# Patient Record
Sex: FEMALE | Race: WHITE | Employment: FULL TIME | URBAN - METROPOLITAN AREA
[De-identification: names, ages, dates, MRNs, and addresses within clinical notes are randomized per-mention and may not be internally consistent; named-entity substitution may affect disease eponyms.]

---

## 2017-12-05 RX ORDER — BISMUTH SUBSALICYLATE 262 MG
1 TABLET,CHEWABLE ORAL DAILY
COMMUNITY

## 2017-12-05 RX ORDER — FLUOXETINE HYDROCHLORIDE 20 MG/1
20 CAPSULE ORAL
COMMUNITY

## 2017-12-05 NOTE — PERIOP NOTES
[facility]  PREOPERATIVE INSTRUCTIONS    Surgery Date:   12/8/2017      Surgery arrival time given by surgeon: NO  (If [facility] staff will call you between 3pm - 7pm the day before surgery with your arrival time. If your surgery is on a Monday, we will call you the preceding Friday. Please call [phone] after 7pm if you did not receive your arrival time.)  1. Report  to the 2nd Floor Admitting Desk on the day of your surgery. Bring your insurance card, photo identification, and any copayment (if applicable). 2. You must have a responsible adult to drive you home and stay with you the first 24 hours after surgery if you are going home the same day of your surgery. 3. Nothing to eat or drink after midnight the night before surgery. This means NO water, gum, mints, coffee, juice, etc.    4. MEDICATIONS TO TAKE THE MORNING OF SURGERY WITH A SIP OF WATER:prozac  5. No alcoholic beverages 24 hours before and after your surgery. 6. If you are being admitted to the hospital,please leave personal belongings/luggage in your car until you have an assigned hospital room number. ( The hospital discharge time is 12 PM NOON. Your adult  should be at the hospital prior to the noon discharge time unless otherwise instructed.)   7. STOP Aspirin and/or any non-steroidal anti-inflammatory drugs (i.e. Ibuprofen, Naproxen, Advil, Aleve) as directed by your surgeon. You may take Tylenol. Stop herbal supplements 1 week prior to  surgery. 8. If you are currently taking Plavix, Coumadin,or any other blood-thinning/ anticoagulant medication contact your surgeon for instructions. 9. Wear comfortable clothes. Wear your glasses instead of contacts. Please leave all money, jewelry and valuables at home. No make up, particularly mascara, the day of surgery. 10.  REMOVE ALL body piercings, rings,and jewelry and leave at home. Wear your hair loose or down, no pony-tails, buns, or any metal hair clips.    11. If you shower the morning of surgery, please do not apply any lotions, powders, or deodorants afterwards. Do not shave any body area within 24 hours of your surgery. 12. Please follow all instructions to avoid any potential surgical cancellation. 13. Should your physical condition change, (i.e. fever, cold, flu, etc.) please notify your surgeon as soon as possible. 14. It is important to be on time. If a situation occurs where you may be delayed, please call:  [phone] / [phone] on the day of surgery. 15. The [facility] staff can be reached at [phone]. 16. Special instructions: none  17. The patient was contacted  via phone. She  verbalize  understanding of all instructions does not  need reinforcement.

## 2017-12-07 ENCOUNTER — ANESTHESIA EVENT (OUTPATIENT)
Dept: SURGERY | Age: 42
End: 2017-12-07
Payer: COMMERCIAL

## 2017-12-07 NOTE — PERIOP NOTES
Spoke to 80Arielle Ward at Dr. Melisa Arellano office requesting orders and H&P for surgery.   DOS: 12/8/2017

## 2017-12-08 ENCOUNTER — ANESTHESIA (OUTPATIENT)
Dept: SURGERY | Age: 42
End: 2017-12-08
Payer: COMMERCIAL

## 2017-12-08 ENCOUNTER — HOSPITAL ENCOUNTER (OUTPATIENT)
Dept: GENERAL RADIOLOGY | Age: 42
Discharge: HOME | End: 2017-12-08
Attending: PODIATRIST
Payer: COMMERCIAL

## 2017-12-08 ENCOUNTER — HOSPITAL ENCOUNTER (OUTPATIENT)
Age: 42
Setting detail: OUTPATIENT SURGERY
Discharge: HOME | End: 2017-12-08
Attending: PODIATRIST | Admitting: PODIATRIST
Payer: COMMERCIAL

## 2017-12-08 VITALS
OXYGEN SATURATION: 99 % | RESPIRATION RATE: 12 BRPM | TEMPERATURE: 97.8 F | HEIGHT: 71 IN | BODY MASS INDEX: 21.7 KG/M2 | SYSTOLIC BLOOD PRESSURE: 112 MMHG | WEIGHT: 154.98 LBS | HEART RATE: 65 BPM | DIASTOLIC BLOOD PRESSURE: 55 MMHG

## 2017-12-08 DIAGNOSIS — Z98.890 S/P BUNIONECTOMY: ICD-10-CM

## 2017-12-08 LAB — HCG UR QL: NEGATIVE

## 2017-12-08 PROCEDURE — 81025 URINE PREGNANCY TEST: CPT

## 2017-12-08 PROCEDURE — 76010000131 HC OR TIME 2 TO 2.5 HR: Performed by: PODIATRIST

## 2017-12-08 PROCEDURE — 74011250636 HC RX REV CODE- 250/636: Performed by: PODIATRIST

## 2017-12-08 PROCEDURE — 76210000023 HC REC RM PH II 2 TO 2.5 HR: Performed by: PODIATRIST

## 2017-12-08 PROCEDURE — 74011250637 HC RX REV CODE- 250/637: Performed by: PODIATRIST

## 2017-12-08 PROCEDURE — 77030031139 HC SUT VCRL2 J&J -A: Performed by: PODIATRIST

## 2017-12-08 PROCEDURE — 77030011881 HC TAPE CST FBRGLS BSNM -A: Performed by: PODIATRIST

## 2017-12-08 PROCEDURE — 77030028224 HC PDNG CST BSNM -A: Performed by: PODIATRIST

## 2017-12-08 PROCEDURE — 74011250636 HC RX REV CODE- 250/636: Performed by: ANESTHESIOLOGY

## 2017-12-08 PROCEDURE — 76000 FLUOROSCOPY <1 HR PHYS/QHP: CPT

## 2017-12-08 PROCEDURE — 77030011640 HC PAD GRND REM COVD -A: Performed by: PODIATRIST

## 2017-12-08 PROCEDURE — 77030008845 HC WRE K STRY -B: Performed by: PODIATRIST

## 2017-12-08 PROCEDURE — 77030020268 HC MISC GENERAL SUPPLY: Performed by: PODIATRIST

## 2017-12-08 PROCEDURE — 77030018836 HC SOL IRR NACL ICUM -A: Performed by: PODIATRIST

## 2017-12-08 PROCEDURE — 74011000250 HC RX REV CODE- 250: Performed by: PODIATRIST

## 2017-12-08 PROCEDURE — 77030000032 HC CUF TRNQT ZIMM -B: Performed by: PODIATRIST

## 2017-12-08 PROCEDURE — 97161 PT EVAL LOW COMPLEX 20 MIN: CPT

## 2017-12-08 PROCEDURE — 77030006773 HC BLD SAW OSC BRSM -A: Performed by: PODIATRIST

## 2017-12-08 PROCEDURE — C1713 ANCHOR/SCREW BN/BN,TIS/BN: HCPCS | Performed by: PODIATRIST

## 2017-12-08 PROCEDURE — 74011000250 HC RX REV CODE- 250

## 2017-12-08 PROCEDURE — 77030002986 HC SUT PROL J&J -A: Performed by: PODIATRIST

## 2017-12-08 PROCEDURE — 77030020782 HC GWN BAIR PAWS FLX 3M -B

## 2017-12-08 PROCEDURE — 76060000035 HC ANESTHESIA 2 TO 2.5 HR: Performed by: PODIATRIST

## 2017-12-08 PROCEDURE — 74011250636 HC RX REV CODE- 250/636

## 2017-12-08 PROCEDURE — 97116 GAIT TRAINING THERAPY: CPT

## 2017-12-08 PROCEDURE — 77030002933 HC SUT MCRYL J&J -A: Performed by: PODIATRIST

## 2017-12-08 RX ORDER — SODIUM CHLORIDE, SODIUM LACTATE, POTASSIUM CHLORIDE, CALCIUM CHLORIDE 600; 310; 30; 20 MG/100ML; MG/100ML; MG/100ML; MG/100ML
125 INJECTION, SOLUTION INTRAVENOUS CONTINUOUS
Status: DISCONTINUED | OUTPATIENT
Start: 2017-12-08 | End: 2017-12-08 | Stop reason: HOSPADM

## 2017-12-08 RX ORDER — BUPIVACAINE HYDROCHLORIDE 5 MG/ML
INJECTION, SOLUTION EPIDURAL; INTRACAUDAL AS NEEDED
Status: DISCONTINUED | OUTPATIENT
Start: 2017-12-08 | End: 2017-12-08 | Stop reason: HOSPADM

## 2017-12-08 RX ORDER — PROPOFOL 10 MG/ML
INJECTION, EMULSION INTRAVENOUS AS NEEDED
Status: DISCONTINUED | OUTPATIENT
Start: 2017-12-08 | End: 2017-12-08 | Stop reason: HOSPADM

## 2017-12-08 RX ORDER — DEXAMETHASONE SODIUM PHOSPHATE 4 MG/ML
INJECTION, SOLUTION INTRA-ARTICULAR; INTRALESIONAL; INTRAMUSCULAR; INTRAVENOUS; SOFT TISSUE AS NEEDED
Status: DISCONTINUED | OUTPATIENT
Start: 2017-12-08 | End: 2017-12-08 | Stop reason: HOSPADM

## 2017-12-08 RX ORDER — LIDOCAINE HYDROCHLORIDE 20 MG/ML
INJECTION, SOLUTION INFILTRATION; PERINEURAL AS NEEDED
Status: DISCONTINUED | OUTPATIENT
Start: 2017-12-08 | End: 2017-12-08 | Stop reason: HOSPADM

## 2017-12-08 RX ORDER — HYDROMORPHONE HYDROCHLORIDE 1 MG/ML
.5-1 INJECTION, SOLUTION INTRAMUSCULAR; INTRAVENOUS; SUBCUTANEOUS
Status: DISCONTINUED | OUTPATIENT
Start: 2017-12-08 | End: 2017-12-08 | Stop reason: HOSPADM

## 2017-12-08 RX ORDER — CEFAZOLIN SODIUM IN 0.9 % NACL 2 G/50 ML
2 INTRAVENOUS SOLUTION, PIGGYBACK (ML) INTRAVENOUS ONCE
Status: COMPLETED | OUTPATIENT
Start: 2017-12-08 | End: 2017-12-08

## 2017-12-08 RX ORDER — OXYCODONE AND ACETAMINOPHEN 5; 325 MG/1; MG/1
2 TABLET ORAL
Status: COMPLETED | OUTPATIENT
Start: 2017-12-08 | End: 2017-12-08

## 2017-12-08 RX ORDER — SODIUM CHLORIDE 0.9 % (FLUSH) 0.9 %
5-10 SYRINGE (ML) INJECTION EVERY 8 HOURS
Status: DISCONTINUED | OUTPATIENT
Start: 2017-12-08 | End: 2017-12-08 | Stop reason: HOSPADM

## 2017-12-08 RX ORDER — FENTANYL CITRATE 50 UG/ML
INJECTION, SOLUTION INTRAMUSCULAR; INTRAVENOUS AS NEEDED
Status: DISCONTINUED | OUTPATIENT
Start: 2017-12-08 | End: 2017-12-08 | Stop reason: HOSPADM

## 2017-12-08 RX ORDER — ONDANSETRON 2 MG/ML
INJECTION INTRAMUSCULAR; INTRAVENOUS AS NEEDED
Status: DISCONTINUED | OUTPATIENT
Start: 2017-12-08 | End: 2017-12-08 | Stop reason: HOSPADM

## 2017-12-08 RX ORDER — IBUPROFEN 800 MG/1
800 TABLET ORAL
Qty: 60 TAB | Refills: 0 | Status: SHIPPED | OUTPATIENT
Start: 2017-12-08 | End: 2022-07-29 | Stop reason: ALTCHOICE

## 2017-12-08 RX ORDER — MIDAZOLAM HYDROCHLORIDE 1 MG/ML
INJECTION, SOLUTION INTRAMUSCULAR; INTRAVENOUS AS NEEDED
Status: DISCONTINUED | OUTPATIENT
Start: 2017-12-08 | End: 2017-12-08 | Stop reason: HOSPADM

## 2017-12-08 RX ORDER — OXYCODONE AND ACETAMINOPHEN 5; 325 MG/1; MG/1
1 TABLET ORAL
Qty: 30 TAB | Refills: 0 | Status: SHIPPED | OUTPATIENT
Start: 2017-12-08 | End: 2022-03-18

## 2017-12-08 RX ORDER — SODIUM CHLORIDE 0.9 % (FLUSH) 0.9 %
5-10 SYRINGE (ML) INJECTION AS NEEDED
Status: DISCONTINUED | OUTPATIENT
Start: 2017-12-08 | End: 2017-12-08 | Stop reason: HOSPADM

## 2017-12-08 RX ORDER — ONDANSETRON 2 MG/ML
4 INJECTION INTRAMUSCULAR; INTRAVENOUS AS NEEDED
Status: DISCONTINUED | OUTPATIENT
Start: 2017-12-08 | End: 2017-12-08 | Stop reason: HOSPADM

## 2017-12-08 RX ORDER — LIDOCAINE HYDROCHLORIDE 10 MG/ML
0.1 INJECTION, SOLUTION EPIDURAL; INFILTRATION; INTRACAUDAL; PERINEURAL AS NEEDED
Status: DISCONTINUED | OUTPATIENT
Start: 2017-12-08 | End: 2017-12-08 | Stop reason: HOSPADM

## 2017-12-08 RX ORDER — PROPOFOL 10 MG/ML
INJECTION, EMULSION INTRAVENOUS
Status: DISCONTINUED | OUTPATIENT
Start: 2017-12-08 | End: 2017-12-08 | Stop reason: HOSPADM

## 2017-12-08 RX ADMIN — MIDAZOLAM HYDROCHLORIDE 1 MG: 1 INJECTION, SOLUTION INTRAMUSCULAR; INTRAVENOUS at 07:54

## 2017-12-08 RX ADMIN — FENTANYL CITRATE 25 MCG: 50 INJECTION, SOLUTION INTRAMUSCULAR; INTRAVENOUS at 07:55

## 2017-12-08 RX ADMIN — SODIUM CHLORIDE, POTASSIUM CHLORIDE, SODIUM LACTATE AND CALCIUM CHLORIDE: 600; 310; 30; 20 INJECTION, SOLUTION INTRAVENOUS at 09:15

## 2017-12-08 RX ADMIN — MIDAZOLAM HYDROCHLORIDE 2 MG: 1 INJECTION, SOLUTION INTRAMUSCULAR; INTRAVENOUS at 07:35

## 2017-12-08 RX ADMIN — PROPOFOL 50 MG: 10 INJECTION, EMULSION INTRAVENOUS at 07:55

## 2017-12-08 RX ADMIN — DEXAMETHASONE SODIUM PHOSPHATE 4 MG: 4 INJECTION, SOLUTION INTRA-ARTICULAR; INTRALESIONAL; INTRAMUSCULAR; INTRAVENOUS; SOFT TISSUE at 07:45

## 2017-12-08 RX ADMIN — PROPOFOL 50 MG: 10 INJECTION, EMULSION INTRAVENOUS at 07:56

## 2017-12-08 RX ADMIN — ONDANSETRON 4 MG: 2 INJECTION INTRAMUSCULAR; INTRAVENOUS at 08:56

## 2017-12-08 RX ADMIN — OXYCODONE HYDROCHLORIDE AND ACETAMINOPHEN 2 TABLET: 5; 325 TABLET ORAL at 11:39

## 2017-12-08 RX ADMIN — FENTANYL CITRATE 25 MCG: 50 INJECTION, SOLUTION INTRAMUSCULAR; INTRAVENOUS at 09:11

## 2017-12-08 RX ADMIN — PROPOFOL 100 MCG/KG/MIN: 10 INJECTION, EMULSION INTRAVENOUS at 07:38

## 2017-12-08 RX ADMIN — FENTANYL CITRATE 25 MCG: 50 INJECTION, SOLUTION INTRAMUSCULAR; INTRAVENOUS at 08:00

## 2017-12-08 RX ADMIN — LIDOCAINE HYDROCHLORIDE 20 MG: 20 INJECTION, SOLUTION INFILTRATION; PERINEURAL at 07:38

## 2017-12-08 RX ADMIN — PROPOFOL 50 MG: 10 INJECTION, EMULSION INTRAVENOUS at 07:40

## 2017-12-08 RX ADMIN — MIDAZOLAM HYDROCHLORIDE 2 MG: 1 INJECTION, SOLUTION INTRAMUSCULAR; INTRAVENOUS at 07:29

## 2017-12-08 RX ADMIN — PROPOFOL 50 MG: 10 INJECTION, EMULSION INTRAVENOUS at 07:45

## 2017-12-08 RX ADMIN — SODIUM CHLORIDE, POTASSIUM CHLORIDE, SODIUM LACTATE AND CALCIUM CHLORIDE: 600; 310; 30; 20 INJECTION, SOLUTION INTRAVENOUS at 06:46

## 2017-12-08 RX ADMIN — CEFAZOLIN 2 G: 1 INJECTION, POWDER, FOR SOLUTION INTRAMUSCULAR; INTRAVENOUS; PARENTERAL at 07:30

## 2017-12-08 RX ADMIN — FENTANYL CITRATE 25 MCG: 50 INJECTION, SOLUTION INTRAMUSCULAR; INTRAVENOUS at 07:30

## 2017-12-08 RX ADMIN — SODIUM CHLORIDE, POTASSIUM CHLORIDE, SODIUM LACTATE AND CALCIUM CHLORIDE 125 ML/HR: 600; 310; 30; 20 INJECTION, SOLUTION INTRAVENOUS at 07:00

## 2017-12-08 NOTE — ANESTHESIA PREPROCEDURE EVALUATION
Anesthetic History   No history of anesthetic complications            Review of Systems / Medical History  Patient summary reviewed and nursing notes reviewed    Pulmonary  Within defined limits                 Neuro/Psych         Psychiatric history    Comments: OCD Cardiovascular  Within defined limits                Exercise tolerance: >4 METS     GI/Hepatic/Renal  Within defined limits              Endo/Other  Within defined limits           Other Findings              Physical Exam    Airway  Mallampati: II    Neck ROM: normal range of motion        Cardiovascular    Rhythm: regular           Dental  No notable dental hx       Pulmonary  Breath sounds clear to auscultation               Abdominal         Other Findings            Anesthetic Plan    ASA: 2  Anesthesia type: MAC            Anesthetic plan and risks discussed with: Patient      Informed consent obtained.

## 2017-12-08 NOTE — IP AVS SNAPSHOT
Kristine Ballesteros 
 
 
 [address] 
[phone] Patient: Willow Pride MRN: [id number] :1975 About your hospitalization You were admitted on:  2017 You last received care in the:  [facility] PACU You were discharged on:  2017 Why you were hospitalized Your primary diagnosis was:  Not on File Things You Need To Do (next 8 weeks) Follow up with Kavon Smiley DPM in 1 week(s) Phone:  [phone] Where:  [address] , [address] [place] [address] Follow up with Zeyad Ragland NP Phone:  [phone] [facility] [address] [facility], [address] [place] [address] Discharge Orders None A check fady indicates which time of day the medication should be taken. My Medications TAKE these medications as instructed Instructions Each Dose to Equal  
 Morning Noon Evening Bedtime  
 ibuprofen 800 mg tablet Commonly known as:  MOTRIN Your last dose was: Your next dose is: Take 1 Tab by mouth every eight (8) hours as needed for Pain. 800 mg  
    
   
   
   
  
 multivitamin tablet Commonly known as:  ONE A DAY Your last dose was: Your next dose is: Take 1 Tab by mouth daily. 1 Tab  
    
   
   
   
  
 oxyCODONE-acetaminophen 5-325 mg per tablet Commonly known as:  PERCOCET Your last dose was: Your next dose is: Take 1 Tab by mouth every six (6) hours as needed for Pain. Max Daily Amount: 4 Tabs. Indications: Pain 1 Tab PROzac 20 mg capsule Generic drug:  FLUoxetine Your last dose was: Your next dose is: Take 20 mg by mouth every morning. 20 mg Where to Get Your Medications Information on where to get these meds will be given to you by the nurse or doctor. ! Ask your nurse or doctor about these medications  
  ibuprofen 800 mg tablet  
 oxyCODONE-acetaminophen 5-325 mg per tablet Discharge Instructions [facility] Podiatric Medicine - Foot Surgery [address] [place] [address]. [address] OFFICE [phone] CELL    [phone] You have just had surgery on your foot and/or ankle. Proper care during the post-operative period is an integral part of your surgical treatment program.  It is imperative that these instructions are followed to insure proper healing and to obtain the best results. 1. GO directly home and keep your feet elevated on the way. 2. DRESSING OR CAST - Keep your dressing or cast clean and dry. DO NOT remove the bandage or inspect the wound. A small amount of blood on the bandage is normal.  If the dressing falls off or gets wet, call the office immediately. 3. ELEVATION - Place two pillows under the knee and leg. Make sure to support underneath your knees. You should elevate your leg whenever you are sitting or lying down. This includes mealtime and when retiring for the night. 4. ICE - Apply 1 or 2 small bags of ice close to, BUT NOT DIRECTLY OVER, the surgical site. The ice should be ON FOR TWENTY MINUTES, THEN OFF FOR ONE HALF HOUR. Continue this sequence throughout the day. Remember to keep the dressing or cast dry. Double-bagging the ice will help. 5. Limited pain and swelling is expected. 6. Exercise your legs frequently by bending your knees to stimulate circulation and speed healing. 7. You are to be:  
· NON-WEIGHT BEARING - you are not allowed to touch your foot to the floor and/or ground. You must use crutches or a walker at all times. 8. If you have a surgical shoe - it should be worn whenever you are standing or walking.  
9. MEALS - Your first meal at home should be a light one such as toast or soup.  If nausea and vomiting develop call the office immediately. Drink plenty of fluid and resume a well balanced diet. 10. Sponge baths are recommended until your first post-operative appointment. 11. PRESCRIPTIONS - Please fill and take as directed. If you have any difficulties or experience any side effects after taking this medication please discontinue and call the office and/or go to your closest Emergency Room immediately. Call our office to make your next appointment; Also, if you have any of the following: · Temperature above 100.5 degrees · Your bandage becomes overly stained, falls off or gets wet · You bump or injure the surgical site · Your medication does not stop discomfort WE WANT YOUR SURGERY AND RECOVERY TO BE SUCCESSFUL AND AS COMFORTABLE AS POSSIBLE. THANK YOU FOR FOLLOWING THESE INSTRUCTIONS. IF YOU HAVE ANY PROBLEMS OR QUESTIONS PLEASE CALL OUR OFFICE. DISCHARGE SUMMARY from your Nurse The following personal items collected during your admission are returned to you:  
Dental Appliance: Dental Appliances: None Vision: Visual Aid: None Hearing Aid:   
Jewelry: Jewelry: None Clothing: Clothing: Other (comment) (street clothes to locker) Other Valuables: Other Valuables: None Valuables sent to safe:   
 
PATIENT INSTRUCTIONS: 
 
After general anesthesia or intravenous sedation, for 24 hours or while taking prescription Narcotics: · Limit your activities · Do not drive and operate hazardous machinery · Do not make important personal or business decisions · Do  not drink alcoholic beverages · If you have not urinated within 8 hours after discharge, please contact your surgeon on call. Report the following to your surgeon: 
· Excessive pain, swelling, redness or odor of or around the surgical area · Temperature over 100.5 · Nausea and vomiting lasting longer than 4 hours or if unable to take medications · Any signs of decreased circulation or nerve impairment to extremity: change in color, persistent  numbness, tingling, coldness or increase pain · Any questions [address] Breathing deep and coughing are very important exercises to do after surgery. Deep breathing and coughing open the little air tubes and air sacks in your lungs. You take deep breaths every day. You may not even notice - it is just something you do when you sigh or yawn. It is a natural exercise you do to keep these air passages open. After surgery, take deep breaths and cough, on purpose. Coughing and deep breathing help prevent bronchitis and pneumonia after surgery. If you had chest or belly surgery, use a pillow as a \"hug randy\" and hold it tightly to your chest or belly when you cough. DIRECTIONS: 
10. Take 10 to 15 slow deep breaths every hour while awake. 11. Breathe in deeply, and hold it for 2 seconds. 12. Exhale slowly through puckered lips, like blowing up a balloon. 13. After every 4th or 5th deep breath, hug your pillow to your chest or belly and give a hard, deep cough. Yes, it will probably hurt. But doing this exercise is very important part of healing after surgery. Take your pain medicine to help you do this exercise without too much pain. IF YOU HAVE BEEN DIAGNOSED WITH SLEEP APNEA, PLEASE USE YOUR SLEEP APNEA DEVICE OR CPAP MACHINE WHEN YOU INTEND TO NAP AFTER TAKING PAIN MEDICATION. Ankle Pumps Ankle pumps increase the circulation of oxygenated blood to your lower extremities and decrease your risk for circulation problems such as blood clots. They also stretch the muscles, tendons and ligaments in your foot and ankle, and prevent joint contracture in the ankle and foot, especially after surgeries on the legs. It is important to do ankle pump exercises regularly after surgery because immobility increases your risk for developing a blood clot.   Your doctor may also have you take an Aspirin for the next few days as well. If your doctor did not ask you to take an Aspirin, consult with him before starting Aspirin therapy on your own. Slowly point your foot forward, feeling the muscles on the top of your lower leg stretch, and hold this position for 5 seconds. Next, pull your foot back toward you as far as possible, stretching the calf muscles, and hold that position for 5 seconds. Repeat with the other foot. Perform 10 repetitions every hour while awake for both ankles if possible (down and then up with the foot once is one repetition). You should feel gentle stretching of the muscles in your lower leg when doing this exercise. If you feel pain, or your range of motion is limited, don't  Push too hard. Only go the limit your joint and muscles will let you go. If you have increasing pain, progressively worsening leg warmth or swelling, STOP the exercise and call your doctor. Below is information about the medications your doctor is prescribing after your visit: 
 
 
Ibuprofen Percocet Introducing [facility] SERVICES! Julio Sky introduces [employer] patient portal. Now you can access parts of your medical record, email your doctor's office, and request medication refills online. 1. In your internet browser, go to [email or web address] 2. Click on the First Time User? Click Here link in the Sign In box. You will see the New Member Sign Up page. 3. Enter your [employer] Access Code exactly as it appears below. You will not need to use this code after youve completed the sign-up process. If you do not sign up before the expiration date, you must request a new code. · [employer] Access Code: JUAN C BONILLA [facility] Expires: 3/8/2018  5:44 AM 
 
4. Enter the last four digits of your Social Security Number (xxxx) and Date of Birth (mm/dd/yyyy) as indicated and click Submit. You will be taken to the next sign-up page. 5. Create a Y[employer]Zing ID. This will be your [employer]ing login ID and cannot be changed, so think of one that is secure and easy to remember. 6. Create a [employer] password. You can change your password at any time. 7. Enter your Password Reset Question and Answer. This can be used at a later time if you forget your password. 8. Enter your e-mail address. You will receive e-mail notification when new information is available in [address] 9. Click Sign Up. You can now view and download portions of your medical record. 10. Click the Download Summary menu link to download a portable copy of your medical information. If you have questions, please visit the Frequently Asked Questions section of the [employer] website.  Remember, [employer] is NOT to be used for urgent needs. For medical emergencies, dial 911. Now available from your iPhone and Android! Providers Seen During Your Hospitalization Provider Specialty Primary office phone Terricaroline Partida, [address] [phone] Your Primary Care Physician (PCP) Primary Care Physician Office Phone Office Fax Jacy Walsh [phone] You are allergic to the following No active allergies Recent Documentation Height Weight BMI OB Status Smoking Status 1.803 m 70.3 kg 21.62 kg/m2 IUD Never Smoker Emergency Contacts Name Discharge Info Relation Home Work Mobile Urbano Lewis DISCHARGE CAREGIVER [3] Spouse [3] [phone] Patient Belongings The following personal items are in your possession at time of discharge: 
  Dental Appliances: None  Visual Aid: None      Home Medications: None   Jewelry: None  Clothing: Other (comment) (street clothes to locker)    Other Valuables: None Please provide this summary of care documentation to your next provider. Signatures-by signing, you are acknowledging that this After Visit Summary has been reviewed with you and you have received a copy. Patient Signature:  ____________________________________________________________ Date:  ____________________________________________________________  
  
Soledad Lozada Provider Signature:  ____________________________________________________________ Date:  ____________________________________________________________

## 2017-12-08 NOTE — BRIEF OP NOTE
BRIEF OPERATIVE NOTE    Date of Procedure: 12/8/2017   Preoperative Diagnosis: RIGHT FOOT PAINFUL BUNION  Postoperative Diagnosis: RIGHT FOOT PAINFUL BUNION    Procedure(s):  RIGHT FOOT BUNIONECTOMY  Surgeon(s) and Role:     * Rafael Blanton DPM - Primary         Assistant Staff:       Surgical Staff:  Circ-1: Michael Issa RN  Circ-Relief: Yessi Mclain RN  Scrub Tech-1: Russ Hardyub RN-Relief: Yessi Mclain RN  Surg Asst-1: Pineda Clore  Event Time In   Incision Start 4111   Incision Close 3089     Anesthesia: MAC   Estimated Blood Loss: Minimal  Specimens: * No specimens in log *   Findings: HAV   Complications: None  Implants:   Implant Name Type Inv.  Item Serial No.  Lot No. LRB No. Used Action   CANNULATED SCREWS     NA [employer] NA Right 2 Implanted

## 2017-12-08 NOTE — DISCHARGE INSTRUCTIONS
[employer] [id number]  [facility]  OFFICE [phone]  CELL    [phone]    You have just had surgery on your foot and/or ankle. Proper care during the post-operative period is an integral part of your surgical treatment program.  It is imperative that these instructions are followed to insure proper healing and to obtain the best results. 1. GO directly home and keep your feet elevated on the way. 2. DRESSING OR CAST - Keep your dressing or cast clean and dry. DO NOT remove the bandage or inspect the wound. A small amount of blood on the bandage is normal.  If the dressing falls off or gets wet, call the office immediately. 3. ELEVATION - Place two pillows under the knee and leg. Make sure to support underneath your knees. You should elevate your leg whenever you are sitting or lying down. This includes mealtime and when retiring for the night. 4. ICE - Apply 1 or 2 small bags of ice close to, BUT NOT DIRECTLY OVER, the surgical site. The ice should be ON FOR TWENTY MINUTES, THEN OFF FOR ONE HALF HOUR. Continue this sequence throughout the day. Remember to keep the dressing or cast dry. Double-bagging the ice will help. 5. Limited pain and swelling is expected. 6. Exercise your legs frequently by bending your knees to stimulate circulation and speed healing. 7. You are to be:   · NON-WEIGHT BEARING - you are not allowed to touch your foot to the floor and/or ground. You must use crutches or a walker at all times. 8. If you have a surgical shoe - it should be worn whenever you are standing or walking. 9. MEALS - Your first meal at home should be a light one such as toast or soup. If nausea and vomiting develop call the office immediately. Drink plenty of fluid and resume a well balanced diet. 10. Sponge baths are recommended until your first post-operative appointment.   11. PRESCRIPTIONS - Please fill and take as directed. If you have any difficulties or experience any side effects after taking this medication please discontinue and call the office and/or go to your closest Emergency Room immediately. Call our office to make your next appointment; Also, if you have any of the following:  · Temperature above 100.5 degrees  · Your bandage becomes overly stained, falls off or gets wet  · You bump or injure the surgical site  · Your medication does not stop discomfort    WE WANT YOUR SURGERY AND RECOVERY TO BE SUCCESSFUL AND AS COMFORTABLE AS POSSIBLE. THANK YOU FOR FOLLOWING THESE INSTRUCTIONS. IF YOU HAVE ANY PROBLEMS OR QUESTIONS PLEASE CALL OUR OFFICE. DISCHARGE SUMMARY from your Nurse    The following personal items collected during your admission are returned to you:   Dental Appliance: Dental Appliances: None  Vision: Visual Aid: None  Hearing Aid:    Jewelry: Jewelry: None  Clothing: Clothing: Other (comment) (street clothes to locker)  Other Valuables: Other Valuables: None  Valuables sent to safe:      PATIENT INSTRUCTIONS:    After general anesthesia or intravenous sedation, for 24 hours or while taking prescription Narcotics:  · Limit your activities  · Do not drive and operate hazardous machinery  · Do not make important personal or business decisions  · Do  not drink alcoholic beverages  · If you have not urinated within 8 hours after discharge, please contact your surgeon on call. Report the following to your surgeon:  · Excessive pain, swelling, redness or odor of or around the surgical area  · Temperature over 100.5  · Nausea and vomiting lasting longer than 4 hours or if unable to take medications  · Any signs of decreased circulation or nerve impairment to extremity: change in color, persistent  numbness, tingling, coldness or increase pain  · Any questions    COUGH AND DEEP BREATHE    Breathing deep and coughing are very important exercises to do after surgery.   Deep breathing and coughing open the little air tubes and air sacks in your lungs. You take deep breaths every day. You may not even notice - it is just something you do when you sigh or yawn. It is a natural exercise you do to keep these air passages open. After surgery, take deep breaths and cough, on purpose. Coughing and deep breathing help prevent bronchitis and pneumonia after surgery. If you had chest or belly surgery, use a pillow as a \"hug randy\" and hold it tightly to your chest or belly when you cough. DIRECTIONS:  10. Take 10 to 15 slow deep breaths every hour while awake. 11. Breathe in deeply, and hold it for 2 seconds. 12. Exhale slowly through puckered lips, like blowing up a balloon. 13. After every 4th or 5th deep breath, hug your pillow to your chest or belly and give a hard, deep cough. Yes, it will probably hurt. But doing this exercise is very important part of healing after surgery. Take your pain medicine to help you do this exercise without too much pain. IF YOU HAVE BEEN DIAGNOSED WITH SLEEP APNEA, PLEASE USE YOUR SLEEP APNEA DEVICE OR CPAP MACHINE WHEN YOU INTEND TO NAP AFTER TAKING PAIN MEDICATION. Ankle Pumps    Ankle pumps increase the circulation of oxygenated blood to your lower extremities and decrease your risk for circulation problems such as blood clots. They also stretch the muscles, tendons and ligaments in your foot and ankle, and prevent joint contracture in the ankle and foot, especially after surgeries on the legs. It is important to do ankle pump exercises regularly after surgery because immobility increases your risk for developing a blood clot. Your doctor may also have you take an Aspirin for the next few days as well. If your doctor did not ask you to take an Aspirin, consult with him before starting Aspirin therapy on your own.       Slowly point your foot forward, feeling the muscles on the top of your lower leg stretch, and hold this position for 5 seconds. Next, pull your foot back toward you as far as possible, stretching the calf muscles, and hold that position for 5 seconds. Repeat with the other foot. Perform 10 repetitions every hour while awake for both ankles if possible (down and then up with the foot once is one repetition). You should feel gentle stretching of the muscles in your lower leg when doing this exercise. If you feel pain, or your range of motion is limited, don't  Push too hard. Only go the limit your joint and muscles will let you go. If you have increasing pain, progressively worsening leg warmth or swelling, STOP the exercise and call your doctor. Below is information about the medications your doctor is prescribing after your visit:    Other information in your discharge envelope:  []     PRESCRIPTIONS  []     PHYSICAL THERAPY PRESCRIPTION  []     APPOINTMENT CARDS  []     Regional Anesthesia Pamphlet for block or block with On-Q Catheter from Anesthesia Service  []     Medical device information sheets/pamphlets from their    []     School/work excuse note. []     /parent work excuse note. These are general instructions for a healthy lifestyle:    *  Please give a list of your current medications to your Primary Care Provider. *  Please update this list whenever your medications are discontinued, doses are      changed, or new medications (including over-the-counter products) are added. *  Please carry medication information at all times in case of emergency situations. About Smoking  No smoking / No tobacco products / Avoid exposure to second hand smoke    Surgeon General's Warning:  Quitting smoking now greatly reduces serious risk to your health. Obesity, smoking, and sedentary lifestyle greatly increases your risk for illness and disease.   A healthy diet, regular physical exercise & weight monitoring are important for maintaining a healthy lifestyle. Congestive Heart Failure  You may be retaining fluid if you have a history of heart failure or if you experience any of the following symptoms:  Weight gain of 3 pounds or more overnight or 5 pounds in a week, increased swelling in our hands or feet or shortness of breath while lying flat in bed. Please call your doctor as soon as you notice any of these symptoms; do not wait until your next office visit. Recognize signs and symptoms of STROKE:  F - face looks uneven  A - arms unable to move or move even  S - speech slurred or non-existent  T - time-call 911 as soon as signs and symptoms begin-DO NOT go         Back to bed or wait to see if you get better-TIME IS BRAIN. Warning signs of HEART ATTACK  Call 911 if you have these symptoms    · Chest discomfort. Most heart attacks involve discomfort in the center of the chest that lasts more than a few minutes, or that goes away and comes back. It can feel like uncomfortable pressure, squeezing, fullness, or pain. · Discomfort in other areas of the upper body. Symptoms can include pain or discomfort in one or both        Arms, the back, neck, jaw, or stomach. ·  Shortness of breath with or without chest discomfort. · Other signs may include breaking out in a cold sweat, nausea, or lightheadedness    Don't wait more than five minutes to call 911 - MINUTES MATTER! Fast action can save your life. Calling 911 is almost always the fastest way to get lifesaving treatment. Emergency Medical Services staff can begin treatment when they arrive - up to an hour sooner than if someone gets to the hospital by car. MARY ALBERTO MEDICATION AND SIDE EFFECT GUIDE    The [facility] MEDICATION AND SIDE EFFECT GUIDE was provided to the PATIENT AND CARE PROVIDER.   Information provided includes instruction about drug purpose and common side effects for the following medications:    Ibuprofen  Percocet

## 2017-12-08 NOTE — PROGRESS NOTES
physical Therapy EVALUATION  (Ambulatory surgery, emergency room & recovery room patients)    Patient: Morelia Officer [de-identified]40 y.o. female)  Date: 12/8/2017  Primary Diagnosis and Medical History: RIGHT FOOT PAINFUL BUNION  Procedure(s) (LRB):  RIGHT FOOT BUNIONECTOMY (Right) Day of Surgery   Past Medical History:   Diagnosis Date    Psychiatric disorder     OCD     Past Surgical History:   Procedure Laterality Date    HX PELVIC LAPAROSCOPY  1995    ovarian cyst removed    HX TONSILLECTOMY  1989   There is no problem list on file for this patient. Prior Level of Function/Home Situation: independent prior to sx  Personal factors and/or comorbidities impacting plan of care: none    Home Situation  Home Environment: Private residence  # Steps to Enter: 5  One/Two Story Residence: Two story  Living Alone: No  Support Systems: Spouse/Significant Other/Partner  Patient Expects to be Discharged to[de-identified] Private residence  Current DME Used/Available at Home: None  Ordered Weight Bearing Status:  right non-weight ,peripheral nerve block Equipment: crutches    EXAMINATION/PRESENTATION/DECISION MAKING:   Critical Behavior:  Alert and oriented x 3     Transfers:  Overall level of assistance required following instruction: contact guard assist given verbal using axillary crutches. Ambulation:  Weight bearing status during ambulation:  NWB RLE amb with crutches 30' with CG, trial of RW but patient steady with crutches        Stair Management: up and down 9 steps with rail and crutch with min assistance     Strength/ROM Limitations:  WNLs except Right knee and ankle NT      Pain:  Pain Scale 1: Numeric (0 - 10)  Pain Intensity 1: 2  Pain Location 1: Foot    Education:  Role of P.T. explained to the patient:  [x]  Yes              []   No       Topics addressed: Comments:   [x]                                    Device use and technique Issued crutches fitted  to patient's height.  present.  Patient demonstrates safe technique with SBA of spouse   [x]                                    Transfer technique    [x]                                    Gait training    [x]                                    Stair training        Patient is discharged from physical therapy at this time.     Raad Shoemaker, PT   Time Calculation: 21 mins

## 2017-12-08 NOTE — H&P
H&P Update:  Kishor Evans was seen and examined. Denies changes since medical clearance. Has matthew cleared by PCP. Patient is Npo since midnight. Consent signed and in chart. All risks, complications and benefits explained. No guarantees made to the outcome of the procedure. To OR for right foot bunionectomy.     Signed By: Sushil Canela DPM     December 8, 2017 7:47 AM

## 2017-12-08 NOTE — IP AVS SNAPSHOT
[address] 
[phone] Patient: Kishor Evans MRN: [id number] :1975 My Medications TAKE these medications as instructed Instructions Each Dose to Equal  
 Morning Noon Evening Bedtime  
 ibuprofen 800 mg tablet Commonly known as:  MOTRIN Your last dose was: Your next dose is: Take 1 Tab by mouth every eight (8) hours as needed for Pain. 800 mg  
    
   
   
   
  
 multivitamin tablet Commonly known as:  ONE A DAY Your last dose was: Your next dose is: Take 1 Tab by mouth daily. 1 Tab  
    
   
   
   
  
 oxyCODONE-acetaminophen 5-325 mg per tablet Commonly known as:  PERCOCET Your last dose was: Your next dose is: Take 1 Tab by mouth every six (6) hours as needed for Pain. Max Daily Amount: 4 Tabs. Indications: Pain 1 Tab PROzac 20 mg capsule Generic drug:  FLUoxetine Your last dose was: Your next dose is: Take 20 mg by mouth every morning. 20 mg Where to Get Your Medications Information on where to get these meds will be given to you by the nurse or doctor. ! Ask your nurse or doctor about these medications  
  ibuprofen 800 mg tablet  
 oxyCODONE-acetaminophen 5-325 mg per tablet

## 2017-12-08 NOTE — ANESTHESIA POSTPROCEDURE EVALUATION
Post-Anesthesia Evaluation and Assessment    Patient: Eduin Vela MRN: [id number]  SSN: [id number]    YOB: 1975  Age: 40 y.o. Sex: female       Cardiovascular Function/Vital Signs  Visit Vitals    /67    Pulse 73    Temp 36.6 °C (97.8 °F)    Resp 15    Ht 5' 11\" (1.803 m)    Wt 70.3 kg (154 lb 15.7 oz)    SpO2 98%    BMI 21.62 kg/m2       Patient is status post MAC anesthesia for Procedure(s):  RIGHT FOOT BUNIONECTOMY. Nausea/Vomiting: None    Postoperative hydration reviewed and adequate. Pain:  Pain Scale 1: Numeric (0 - 10) (12/08/17 0948)  Pain Intensity 1: 0 (12/08/17 0948)   Managed    Neurological Status:   Neuro (WDL): Within Defined Limits (12/08/17 0942)  Neuro  LUE Motor Response: Purposeful (12/08/17 0942)  LLE Motor Response: Purposeful (12/08/17 0942)  RUE Motor Response: Purposeful (12/08/17 0942)  RLE Motor Response: Numbness (12/08/17 0942)   At baseline    Mental Status and Level of Consciousness: Arousable    Pulmonary Status:   O2 Device: Room air (12/08/17 0943)   Adequate oxygenation and airway patent    Complications related to anesthesia: None    Post-anesthesia assessment completed.  No concerns    Signed By: Gia Mandel DO     December 8, 2017

## 2017-12-15 NOTE — OP NOTES
[facility]  OPERATIVE REPORT    Ivan López  MR#: [id number]  : 1975  ACCOUNT #: [de-identified]   DATE OF SERVICE: 2017    POOTOPERATIVE SUMMARY     DATE OF SURGERY: 2017    Delay in dictation due to the dictation line being out of service. SURGEON: Kavon Smiley DPM    ASSISTANTS: Documented in brief operative note. PREOPERATIVE DIAGNOSIS: Right foot painful hallux valgus. POSTOPERATIVE DIAGNOSIS: Right foot painful hallux valgus. PROCEDURE PERFORMED: Right foot Pablo bunionectomy. SPECIMENS REMOVED: None. ANESTHESIA: MAC with 10 mL 1:1 mixture of 1% lidocaine plain and 0.5% Marcaine plain. COMPLICATIONS: None. CONDITION: Stable. HEMOSTASIS: Achieved with a pneumatic ankle tourniquet at 250 mmHg of 90 minutes. ESTIMATED BLOOD LOSS: Minimal.     INJECTABLES: 10 mL 0.5% Marcaine plain. INDICATION FOR PROCEDURE: The patient is a 42-year-old female that presented to the preoperative holding area for right foot painful hallux valgus. She has tried conservative treatment and failed. She would like to undergo surgical intervention to improve her pain. Upon review of x-rays, there is an increase in IM angle with a prominent medial eminence of the first metatarsal head. Procedure was discussed in detail with patient and patient wants to continue with the procedure. Consent was signed in the preoperative  holding area. Site and side was marked. All risks, complications and benefits were explained. PROCEDURE IN DETAIL: Patient was identified in the preoperative holding area, cleared by nursing and anesthesia, taken back to the operating room and sedated. Attention was then directed to the right lower extremity where the right foot was injected with 10 mL of 1:1 mixture of 1% lidocaine plain and 0.5% Marcaine plain. An ankle tourniquet was placed on the right ankle. The right foot sterilely prepped and draped.  All surgical staff was sterilely scrubbed and gowned. A skin marker was used to draw an incision line over the right first MPJ. Timeout was performed. Exsanguination of the foot was achieved with an Esmarch and the tourniquet was inflated. A 15 blade was used to make the incision, blunt dissection achieved with a Metzenbaum. All vital structures including neurovascular structures and tendons were retracted and a new 15 blade was used to incise through the periosteum and capsule. Prior to periosteum and capsule incision, a Metzenbaum was used to release the adductor hallucis muscle from the lateral aspect of the proximal phalanx. Once the incision was made over the periosteum and the capsule and reflected, clear visualization of the first metatarsophalangeal joint was achieved. At this point, a McGlamry elevator was used to release the sesamoid apparatus. A sagittal saw was then used to resect the prominent medial eminence of the first metatarsal head. The patient's leg was frog-legged and a chevron osteotomy was then made in the first metatarsal head. The capital fragment for the first metatarsal head was moved laterally on the shaft and held in place with 2 K-wires. Good reduction of the hallus valgus deformity was achieved. At this time, the K-wires were measured and the area was countersunk and 2 screws were placed into the capital fragment and first metatarsal to hold the osteotomy in place. The area was flushed, redundant first metatarsal bone was removed. The area was flushed again. The capsule was closed with 2-0 Vicryl, subcutaneous tissue with 3-0 Vicryl, skin with 4-0 Monocryl and 4-0 Prolene. Good reduction of the hallux valgus deformity was achieved and the surgical site was injected with 10 mL of 0.5% Marcaine plain.  Surgical site was dressed with Betadine soaked Adaptic, dry sterile dressing and a below-knee cast. Patient tolerated the procedure well and was transferred to the PACU with stable vitals and her neurovascular status was intact. She was given appropriate postoperative instructions to remain nonweightbearing to the right foot. She was given pain medication for pain control at home. She is to follow up with Dr. Josie Pineda in one week.       KT Brito / TUNG  D: 12/14/2017 10:31     T: 12/14/2017 15:07  JOB #: [id number]

## 2022-03-18 ENCOUNTER — OFFICE VISIT (OUTPATIENT)
Dept: NEUROLOGY | Age: 47
End: 2022-03-18
Payer: COMMERCIAL

## 2022-03-18 VITALS
WEIGHT: 156 LBS | RESPIRATION RATE: 16 BRPM | OXYGEN SATURATION: 97 % | TEMPERATURE: 97.5 F | BODY MASS INDEX: 21.84 KG/M2 | DIASTOLIC BLOOD PRESSURE: 72 MMHG | HEIGHT: 71 IN | HEART RATE: 90 BPM | SYSTOLIC BLOOD PRESSURE: 103 MMHG

## 2022-03-18 DIAGNOSIS — G43.019 INTRACTABLE MIGRAINE WITHOUT AURA AND WITHOUT STATUS MIGRAINOSUS: Primary | ICD-10-CM

## 2022-03-18 PROCEDURE — 99204 OFFICE O/P NEW MOD 45 MIN: CPT | Performed by: PSYCHIATRY & NEUROLOGY

## 2022-03-18 RX ORDER — SPIRONOLACTONE 50 MG/1
TABLET, FILM COATED ORAL DAILY
COMMUNITY

## 2022-03-18 RX ORDER — RIMEGEPANT SULFATE 75 MG/75MG
TABLET, ORALLY DISINTEGRATING ORAL
Qty: 3 TABLET | Refills: 3 | Status: SHIPPED | OUTPATIENT
Start: 2022-03-18 | End: 2022-04-15 | Stop reason: SDUPTHER

## 2022-03-18 NOTE — PROGRESS NOTES
[address] [facility] and [address] at [facility] at [address], [address] [facility] [address] [facility], [address] [place] [address]  [phone] Office  [phone] Facsimile           Referring: Arsenio Delgado NP      Chief Complaint   Patient presents with    New Patient    Headache     started as far back as she can remember. 1st saw neuro in 1994, placed on ?clonazepam.  did not work, was told she has hormonal HAs and left it at that. fast foward to now, they started to ramp up, saw GYN and they did not see any abnl hormone levels so they were not sure this was cause. occurring now 3 times per week. sx include nausea, pain, photophobia   50-year-old lady presents for neurologic consultation regarding headaches. She has had headaches for as long as she can remember. She can remember being a child having headaches taking Motrin and going on to school. As she got older and started getting around menarche she started to have increasing headaches. She was taken to a neurologist who tried her on a medicine she does not recall and it was unhelpful. She was told that she is a female and its hormonal and there is nothing that can be done so she would have to deal with it. She continued on into her adult life. She is gotten to a point in her life now where the headaches are increasing both in frequency and intensity. Weather is a trigger for her and she can predict the next rainstorm. In an average month she is getting about 8 migraines approximately 2/week. The last 24 hours. She has associated photophobia and nausea. No phonophobia or vomiting. She typically sequestered herself in a dark room and waits it out. What prompted her visit is she had 1 last week that was the worst she has had in a number of years that actually sent her to the emergency department.   She has tried over-the-counter medicines. She has had no focal deficits with headache. No aura. Brother has migraines as well as his sons. Past Medical History:   Diagnosis Date    Anxiety     Psychiatric disorder     OCD       Past Surgical History:   Procedure Laterality Date    HX BUNIONECTOMY Right 2018    HX PELVIC LAPAROSCOPY  1995    ovarian cyst removed       Current Outpatient Medications   Medication Sig Dispense Refill    spironolactone (ALDACTONE) 50 mg tablet Take  by mouth daily.  ibuprofen (MOTRIN) 800 mg tablet Take 1 Tab by mouth every eight (8) hours as needed for Pain. 60 Tab 0    FLUoxetine (PROZAC) 20 mg capsule Take 20 mg by mouth every morning.  multivitamin (ONE A DAY) tablet Take 1 Tab by mouth daily. No Known Allergies    Social History     Tobacco Use    Smoking status: Never Smoker    Smokeless tobacco: Never Used   Vaping Use    Vaping Use: Never used   Substance Use Topics    Alcohol use: Yes     Comment: socially, once a week    Drug use: No       Family History   Problem Relation Age of Onset    Atrial Fibrillation Mother     Prostate Cancer Father      Review of Systems  Pertinent positives and negatives as noted. Examination  Visit Vitals  /72 (BP 1 Location: Left upper arm, BP Patient Position: Sitting, BP Cuff Size: Adult)   Pulse 90   Temp 97.5 °F (36.4 °C)   Resp 16   Ht 5' 11\" (1.803 m)   Wt 70.8 kg (156 lb)   SpO2 97%   BMI 21.76 kg/m²     Neurologically, she is awake, alert, and oriented with normal speech and language. Her cognition is normal.    Intact cranial nerves 2-12. No nystagmus. Disk margins are flat bilaterally. She has normal bulk and tone. She has no abnormal movement. She has no pronation or drift. She generates full strength in the upper and lower extremities to direct confrontational testing. Reflexes are symmetrical in the upper and lower extremities bilaterally. No pathologic reflexes are elicited.   Finger nose finger and rapid alternating movements are normal.  Steady gait. Impression/Plan  Intractable migraine headaches  We discussed preventative strategy as well as abortive strategy  My initial thought is to try treating these more effectively using Nurtec which has 48 hours of headache freedom to see if we treat them more effectively we can actually reduce the monthly frequency without having to go to a preventative regimen. This also will give us a chance to see if Nurtec is effective for her. She will track her headaches only calendar and return in 2 months. We will look at the data and decide whether to continue with an abortive only strategy versus changing Nurtec to an every other day regimen versus changing to something different if Nurtec is not effective  Await her return visit and we will make further recommendations at that point    Abdoul Lund MD          This note was created using voice recognition software. Despite editing, there may be syntax errors.

## 2022-04-15 ENCOUNTER — TELEPHONE (OUTPATIENT)
Dept: NEUROLOGY | Age: 47
End: 2022-04-15

## 2022-04-15 RX ORDER — RIMEGEPANT SULFATE 75 MG/75MG
TABLET, ORALLY DISINTEGRATING ORAL
Qty: 8 TABLET | Refills: 3 | Status: SHIPPED | OUTPATIENT
Start: 2022-04-15 | End: 2022-05-13 | Stop reason: ALTCHOICE

## 2022-04-15 NOTE — TELEPHONE ENCOUNTER
Re: Sindhu WYATT request from [facility] via fax. Iniated case in [facility], key # [id number]. PA pending. Sent msg to MD to verify quantity on script. Rcvd msg from MD. 8 per 30. PA submitted. Awaiting update.

## 2022-04-20 NOTE — TELEPHONE ENCOUNTER
Re: Kesha Calvo approval letter from [employer] via fax. Scanned to chart. Approved: 04/17/22 - 04/17/23. Sent nurse and pt mychart msg w/ update. Will update [facility] via phone.

## 2022-05-13 ENCOUNTER — VIRTUAL VISIT (OUTPATIENT)
Dept: NEUROLOGY | Age: 47
End: 2022-05-13
Payer: COMMERCIAL

## 2022-05-13 DIAGNOSIS — G43.909 MIGRAINE WITHOUT STATUS MIGRAINOSUS, NOT INTRACTABLE, UNSPECIFIED MIGRAINE TYPE: Primary | ICD-10-CM

## 2022-05-13 PROCEDURE — 99214 OFFICE O/P EST MOD 30 MIN: CPT | Performed by: NURSE PRACTITIONER

## 2022-05-13 RX ORDER — RIMEGEPANT SULFATE 75 MG/75MG
TABLET, ORALLY DISINTEGRATING ORAL
Qty: 16 TABLET | Refills: 5 | Status: SHIPPED | OUTPATIENT
Start: 2022-05-13 | End: 2022-07-29 | Stop reason: SDUPTHER

## 2022-05-13 NOTE — PROGRESS NOTES
Kimi Bernal is a 40 y.o. female who was seen by synchronous (real-time) audio-video technology on 5/13/2022 for Migraine    Virtual FU migraines   She has been using the Nurtec fairly regularly and it is helping significantly. She has not really had many headaches at all since starting   Has been taking every other day and been noticing only one bad migraine since last visit. This week due to weather. This is her biggest trigger  She does work on the computer and has blue light, can control environment. She is happy with where things are   No changes in headaches   No side effects of Nurtec. Assessment & Plan:   Diagnoses and all orders for this visit:    1. Migraine without status migrainosus, not intractable, unspecified migraine type  -     rimegepant (Nurtec ODT) 75 mg disintegrating tablet; Take 1 tablet by mouth every other day for migraine prevention      Nurtec has been helping with headaches significantly. She has only had one real big migraine since last visit and this was recently due to the changes in weather  Will switch from rescue to prevention for migraine   1 tablet every other day for prevention   Update PA and keep track of symptoms. Subjective:       Prior to Admission medications    Medication Sig Start Date End Date Taking? Authorizing Provider   rimegepant (Nurtec ODT) 75 mg disintegrating tablet Take 1 tablet by mouth every other day for migraine prevention 5/13/22  Yes Yeimi Jewell NP   spironolactone (ALDACTONE) 50 mg tablet Take  by mouth daily. Yes Provider, Historical   FLUoxetine (PROZAC) 20 mg capsule Take 20 mg by mouth every morning. Yes Provider, Historical   multivitamin (ONE A DAY) tablet Take 1 Tab by mouth daily.    Yes Provider, Historical   rimegepant (Nurtec ODT) 75 mg disintegrating tablet 1 po at migraine onset  Max 1 tab/24 hours 4/15/22 5/13/22  Simin Mcpherson MD   ibuprofen (MOTRIN) 800 mg tablet Take 1 Tab by mouth every eight (8) hours as needed for Pain. 12/8/17   Kavon Smiley, DPM     There is no problem list on file for this patient. There are no problems to display for this patient. Current Outpatient Medications   Medication Sig Dispense Refill    rimegepant (Nurtec ODT) 75 mg disintegrating tablet Take 1 tablet by mouth every other day for migraine prevention 16 Tablet 5    spironolactone (ALDACTONE) 50 mg tablet Take  by mouth daily.  FLUoxetine (PROZAC) 20 mg capsule Take 20 mg by mouth every morning.  multivitamin (ONE A DAY) tablet Take 1 Tab by mouth daily.  ibuprofen (MOTRIN) 800 mg tablet Take 1 Tab by mouth every eight (8) hours as needed for Pain. 61 Tab 0     No Known Allergies  Past Medical History:   Diagnosis Date    Anxiety     Psychiatric disorder     OCD     Past Surgical History:   Procedure Laterality Date    HX BUNIONECTOMY Right 2018    HX PELVIC LAPAROSCOPY  1995    ovarian cyst removed     Family History   Problem Relation Age of Onset    Atrial Fibrillation Mother     Prostate Cancer Father      Social History     Tobacco Use    Smoking status: Never Smoker    Smokeless tobacco: Never Used   Substance Use Topics    Alcohol use: Yes     Comment: socially, once a week       Review of Systems   Eyes: Positive for photophobia. Negative for blurred vision and double vision. Neurological: Positive for headaches. Negative for dizziness, seizures and loss of consciousness. Objective:   No flowsheet data found.      [INSTRUCTIONS:  \"[x]\" Indicates a positive item  \"[]\" Indicates a negative item  -- DELETE ALL ITEMS NOT EXAMINED]    Constitutional: [x] Appears well-developed and well-nourished [x] No apparent distress      [] Abnormal -     Mental status: [x] Alert and awake  [x] Oriented to person/place/time [x] Able to follow commands    [] Abnormal -     Eyes:   EOM    [x]  Normal    [] Abnormal -   Sclera  [x]  Normal    [] Abnormal -          Discharge [x]  None visible   [] Abnormal -     HENT: [x] Normocephalic, atraumatic  [] Abnormal -   [x] Mouth/Throat: Mucous membranes are moist    External Ears [x] Normal  [] Abnormal -    Neck: [x] No visualized mass [] Abnormal -     Pulmonary/Chest: [x] Respiratory effort normal   [x] No visualized signs of difficulty breathing or respiratory distress        [] Abnormal -      Musculoskeletal:   [x] Normal gait with no signs of ataxia         [x] Normal range of motion of neck        [] Abnormal -     Neurological:        [x] No Facial Asymmetry (Cranial nerve 7 motor function) (limited exam due to video visit)          [x] No gaze palsy        [] Abnormal -          Skin:        [x] No significant exanthematous lesions or discoloration noted on facial skin         [] Abnormal -            Psychiatric:       [x] Normal Affect [] Abnormal -        [x] No Hallucinations    Other pertinent observable physical exam findings:-        We discussed the expected course, resolution and complications of the diagnosis(es) in detail. Medication risks, benefits, costs, interactions, and alternatives were discussed as indicated. I advised her to contact the office if her condition worsens, changes or fails to improve as anticipated. She expressed understanding with the diagnosis(es) and plan. Madisyn García, was evaluated through a synchronous (real-time) audio-video encounter. The patient (or guardian if applicable) is aware that this is a billable service, which includes applicable co-pays. Verbal consent to proceed has been obtained. The visit was conducted pursuant to the emergency declaration under the [address] [place] [address] and the [employer] General Act. Patient identification was verified, and a caregiver was present when appropriate. The patient was located at home in a state where the provider was licensed to provide care.       Octavio Jewell NP

## 2022-07-29 ENCOUNTER — OFFICE VISIT (OUTPATIENT)
Dept: NEUROLOGY | Age: 47
End: 2022-07-29
Payer: COMMERCIAL

## 2022-07-29 ENCOUNTER — TELEPHONE (OUTPATIENT)
Dept: NEUROLOGY | Age: 47
End: 2022-07-29

## 2022-07-29 VITALS
OXYGEN SATURATION: 99 % | HEART RATE: 75 BPM | SYSTOLIC BLOOD PRESSURE: 116 MMHG | TEMPERATURE: 97.3 F | DIASTOLIC BLOOD PRESSURE: 86 MMHG

## 2022-07-29 DIAGNOSIS — G43.909 MIGRAINE WITHOUT STATUS MIGRAINOSUS, NOT INTRACTABLE, UNSPECIFIED MIGRAINE TYPE: Primary | ICD-10-CM

## 2022-07-29 PROCEDURE — 99214 OFFICE O/P EST MOD 30 MIN: CPT | Performed by: NURSE PRACTITIONER

## 2022-07-29 PROCEDURE — 96372 THER/PROPH/DIAG INJ SC/IM: CPT | Performed by: NURSE PRACTITIONER

## 2022-07-29 RX ORDER — RIMEGEPANT SULFATE 75 MG/75MG
TABLET, ORALLY DISINTEGRATING ORAL
Qty: 8 TABLET | Refills: 5 | Status: SHIPPED | OUTPATIENT
Start: 2022-07-29

## 2022-07-29 RX ORDER — ERENUMAB-AOOE 70 MG/ML
70 INJECTION SUBCUTANEOUS
Qty: 1 EACH | Refills: 3 | Status: SHIPPED | OUTPATIENT
Start: 2022-07-29 | End: 2022-08-25 | Stop reason: SDUPTHER

## 2022-07-29 RX ORDER — BUTALBITAL, ACETAMINOPHEN AND CAFFEINE 50; 325; 40 MG/1; MG/1; MG/1
1 TABLET ORAL
COMMUNITY
End: 2022-10-17

## 2022-07-29 NOTE — PROGRESS NOTES
Jo Ann Rodriguez is a 56 y.o. female who presents with the following  Chief Complaint   Patient presents with    Migraine       HPI    Fu migraine. Had been taking Nurtec ODT for PRN   Switched last visit to prevention and was never able to get it  Insurance approved 8/30 days until April 2023   Will get back with PRN   She has had a 6 day migraine   Not able to break cycle. Found some Fioricet and took last night, made her anxiety worse but did help the pain   She still has pain     Usually unilateral, or in the top of head, or back. Hypersensitivity  No nausea, vomiting, dizziness. She is on Prozac. She can not take any BP medications due to hypotension   Having about 8-10 migraine a month       No Known Allergies    Current Outpatient Medications   Medication Sig    butalbital-acetaminophen-caffeine (FIORICET, ESGIC) -40 mg per tablet Take 1 Tablet by mouth every four (4) hours as needed for Headache.    erenumab-aooe (Aimovig Autoinjector) 70 mg/mL injection 1 mL by SubCUTAneous route every thirty (30) days. rimegepant (Nurtec ODT) 75 mg disintegrating tablet Take 1 tablet by mouth daily PRN. Max 1 tablet in 24 hours. spironolactone (ALDACTONE) 50 mg tablet Take  by mouth daily. FLUoxetine (PROzac) 20 mg capsule Take 20 mg by mouth every morning.    multivitamin (ONE A DAY) tablet Take 1 Tab by mouth daily. No current facility-administered medications for this visit.        Social History     Tobacco Use   Smoking Status Never   Smokeless Tobacco Never       Past Medical History:   Diagnosis Date    Anxiety     Psychiatric disorder     OCD       Past Surgical History:   Procedure Laterality Date    HX BUNIONECTOMY Right 2018    HX PELVIC LAPAROSCOPY  1995    ovarian cyst removed       Family History   Problem Relation Age of Onset    Atrial Fibrillation Mother     Prostate Cancer Father        Social History     Socioeconomic History    Marital status:    Tobacco Use    Smoking status: Never    Smokeless tobacco: Never   Vaping Use    Vaping Use: Never used   Substance and Sexual Activity    Alcohol use: Yes     Comment: socially, once a week    Drug use: No       Review of Systems   Eyes:  Positive for blurred vision and photophobia. Negative for double vision. Respiratory:  Negative for shortness of breath and wheezing. Cardiovascular:  Negative for chest pain and palpitations. Gastrointestinal:  Negative for nausea and vomiting. Neurological:  Positive for headaches. Negative for dizziness, seizures, loss of consciousness and weakness. Remainder of comprehensive review is negative. Physical Exam :    Visit Vitals  /86   Pulse 75   Temp 97.3 °F (36.3 °C)   SpO2 99%       General: Well defined, nourished, and groomed individual in no acute distress. Neck: Supple, nontender, no bruits, no pain with resistance to active range of motion. Musculoskeletal: Extremities revealed no edema and had full range of motion of joints. Psych: Good mood and bright affect    NEUROLOGICAL EXAMINATION:    Mental Status: Alert and oriented to person, place, and time    Cranial Nerves:    II, III, IV, VI: Visual acuity grossly intact. Visual fields are normal.    Pupils are equal, round, and reactive to light and accommodation. Extra-ocular movements are full and fluid. Fundoscopic exam was benign, no ptosis or nystagmus. V-XII: Hearing is grossly intact. Facial features are symmetric, with normal sensation and strength. The palate rises symmetrically and the tongue protrudes midline. Sternocleidomastoids 5/5. Motor Examination: Normal tone, bulk, and strength, 5/5 muscle strength throughout. Coordination: Finger to nose was normal. No resting or intention tremor    Gait and Station: Steady while walking. Normal arm swing. No pronator drift. No muscle wasting or fasiculations noted. Reflexes: DTRs 2+ throughout.           Results for orders placed or performed during the hospital encounter of 17   HCG URINE, QL. - POC   Result Value Ref Range    Pregnancy test,urine (POC) NEGATIVE  NEG         Orders Placed This Encounter    butalbital-acetaminophen-caffeine (FIORICET, ESGIC) -40 mg per tablet     Sig: Take 1 Tablet by mouth every four (4) hours as needed for Headache.    erenumab-aooe (Aimovig Autoinjector) 70 mg/mL injection     Si mL by SubCUTAneous route every thirty (30) days. Dispense:  1 Each     Refill:  3    rimegepant (Nurtec ODT) 75 mg disintegrating tablet     Sig: Take 1 tablet by mouth daily PRN. Max 1 tablet in 24 hours. Dispense:  8 Tablet     Refill:  5       1. Migraine without status migrainosus, not intractable, unspecified migraine type        Migraines  Initiate Aimovig 70 mg every 30 days for prevention of migraine     Nurtec odt for PRN has been approved until 2023   Will use this rescue   Fioricet helps but makes her tired.      60 mg IM Toradol given without side effects             This note will not be viewable in [employer]

## 2022-08-25 ENCOUNTER — PATIENT MESSAGE (OUTPATIENT)
Dept: NEUROLOGY | Age: 47
End: 2022-08-25

## 2022-08-25 DIAGNOSIS — G43.909 MIGRAINE WITHOUT STATUS MIGRAINOSUS, NOT INTRACTABLE, UNSPECIFIED MIGRAINE TYPE: ICD-10-CM

## 2022-08-25 RX ORDER — ERENUMAB-AOOE 70 MG/ML
70 INJECTION SUBCUTANEOUS
Qty: 1 EACH | Refills: 3 | Status: SHIPPED | OUTPATIENT
Start: 2022-08-25

## 2022-08-25 NOTE — PROGRESS NOTES
Aimovig 70 mg  1 / 30 days     PA Case: [id number], Status: Approved, Coverage Starts on: 8/25/2022 12:00:00 AM, Coverage Ends on: 11/23/2022 12:00:00 AM.

## 2022-08-30 ENCOUNTER — PATIENT MESSAGE (OUTPATIENT)
Dept: NEUROLOGY | Age: 47
End: 2022-08-30

## 2022-10-17 ENCOUNTER — OFFICE VISIT (OUTPATIENT)
Dept: NEUROLOGY | Age: 47
End: 2022-10-17
Payer: COMMERCIAL

## 2022-10-17 VITALS
WEIGHT: 178 LBS | OXYGEN SATURATION: 99 % | SYSTOLIC BLOOD PRESSURE: 112 MMHG | HEART RATE: 75 BPM | BODY MASS INDEX: 24.83 KG/M2 | DIASTOLIC BLOOD PRESSURE: 68 MMHG

## 2022-10-17 DIAGNOSIS — G43.909 MIGRAINE WITHOUT STATUS MIGRAINOSUS, NOT INTRACTABLE, UNSPECIFIED MIGRAINE TYPE: Primary | ICD-10-CM

## 2022-10-17 PROCEDURE — 99214 OFFICE O/P EST MOD 30 MIN: CPT | Performed by: NURSE PRACTITIONER

## 2022-10-17 NOTE — PROGRESS NOTES
Migraines have been doing much better  Aimovig seems to be work well, nurtec seems to work well  Has had a full blown migraine since LOV

## 2022-11-14 DIAGNOSIS — G43.909 MIGRAINE WITHOUT STATUS MIGRAINOSUS, NOT INTRACTABLE, UNSPECIFIED MIGRAINE TYPE: ICD-10-CM

## 2022-11-14 RX ORDER — ERENUMAB-AOOE 70 MG/ML
INJECTION SUBCUTANEOUS
Qty: 1 ML | Refills: 3 | Status: SHIPPED | OUTPATIENT
Start: 2022-11-14

## 2023-02-16 ENCOUNTER — HOSPITAL ENCOUNTER (OUTPATIENT)
Dept: ULTRASOUND IMAGING | Age: 48
Discharge: HOME | End: 2023-02-16
Attending: NURSE PRACTITIONER
Payer: COMMERCIAL

## 2023-02-16 ENCOUNTER — TRANSCRIBE ORDER (OUTPATIENT)
Dept: SCHEDULING | Age: 48
End: 2023-02-16

## 2023-02-16 DIAGNOSIS — R10.11 RUQ PAIN: Primary | ICD-10-CM

## 2023-02-16 DIAGNOSIS — R10.11 RUQ PAIN: ICD-10-CM

## 2023-02-16 PROCEDURE — 76705 ECHO EXAM OF ABDOMEN: CPT

## 2023-02-17 ENCOUNTER — TRANSCRIBE ORDER (OUTPATIENT)
Dept: SCHEDULING | Age: 48
End: 2023-02-17

## 2023-04-19 DIAGNOSIS — G43.909 MIGRAINE WITHOUT STATUS MIGRAINOSUS, NOT INTRACTABLE, UNSPECIFIED MIGRAINE TYPE: ICD-10-CM

## 2023-04-19 RX ORDER — ERENUMAB-AOOE 70 MG/ML
INJECTION SUBCUTANEOUS
Qty: 3 ML | Refills: 1 | Status: SHIPPED | OUTPATIENT
Start: 2023-04-19

## 2023-07-21 ENCOUNTER — OFFICE VISIT (OUTPATIENT)
Age: 48
End: 2023-07-21
Payer: COMMERCIAL

## 2023-07-21 VITALS
HEART RATE: 82 BPM | RESPIRATION RATE: 16 BRPM | TEMPERATURE: 97.3 F | DIASTOLIC BLOOD PRESSURE: 74 MMHG | OXYGEN SATURATION: 98 % | SYSTOLIC BLOOD PRESSURE: 124 MMHG

## 2023-07-21 DIAGNOSIS — H53.9 VISUAL CHANGES: ICD-10-CM

## 2023-07-21 DIAGNOSIS — G43.909 MIGRAINE WITHOUT STATUS MIGRAINOSUS, NOT INTRACTABLE, UNSPECIFIED MIGRAINE TYPE: Primary | ICD-10-CM

## 2023-07-21 DIAGNOSIS — G43.711 CHRONIC MIGRAINE WITHOUT AURA, INTRACTABLE, WITH STATUS MIGRAINOSUS: ICD-10-CM

## 2023-07-21 PROCEDURE — 99214 OFFICE O/P EST MOD 30 MIN: CPT | Performed by: NURSE PRACTITIONER

## 2023-07-21 RX ORDER — RIMEGEPANT SULFATE 75 MG/75MG
TABLET, ORALLY DISINTEGRATING ORAL
Qty: 8 TABLET | Refills: 5 | Status: SHIPPED | OUTPATIENT
Start: 2023-07-21

## 2023-07-24 NOTE — PROGRESS NOTES
Daryle Leigh is a 53 y.o. female who presents with the following  Chief Complaint   Patient presents with    Migraine     Patient states she is not doing well she has had 20 headaches in a month. HPI    Patient comes in for follow-up for chronic migraine  She has been having about 20 migraines a month  She continues to be debilitated day-to-day with these  She has had 2 use rescue in regard to the Nurtec  It does help but she has run out due to the significant frequency of her migraines  She is on Aimovig 70 mg and feels like its not working  She is also on Prozac  Spironolactone  She has failed Topamax, amitriptyline    The migraines are usually located in the back of her head and can come over to the top  She has a sharp stabbing pains  She has hypersensitivity to light sound and smell  Nothing else has changed in regard to health or new medications  No increase in stress  These migraines are lasting anywhere from 8 hours to longer  She has not had any other neurological symptoms with these        No Known Allergies    Current Outpatient Medications   Medication Sig Dispense Refill    Onabotulinumtoxin A (BOTOX) 200 units injection Inject 155 units into 31 FDA approved sites in head, face, neck, every 3 months for chronic migraine. 1 each 3    Rimegepant Sulfate (NURTEC) 75 MG TBDP 1 tablet at ha onset PRN. Max 1 tablet in 24 hours. 8 tablet 5    Erenumab-aooe (AIMOVIG) 70 MG/ML SOAJ 1 ML BY SUBCUTANEOUS ROUTE EVERY THIRTY (30) DAYS. FLUoxetine (PROZAC) 20 MG capsule Take 1 capsule by mouth      Rimegepant Sulfate (NURTEC) 75 MG TBDP Take 1 tablet by mouth daily PRN. Max 1 tablet in 24 hours. spironolactone (ALDACTONE) 50 MG tablet Take by mouth daily       No current facility-administered medications for this visit.         Social History     Tobacco Use   Smoking Status Never   Smokeless Tobacco Never       Past Medical History:   Diagnosis Date    Anxiety     Psychiatric disorder

## 2023-08-02 ENCOUNTER — TELEPHONE (OUTPATIENT)
Age: 48
End: 2023-08-02

## 2023-08-04 ENCOUNTER — HOSPITAL ENCOUNTER (OUTPATIENT)
Facility: HOSPITAL | Age: 48
Discharge: HOME | End: 2023-08-04
Payer: COMMERCIAL

## 2023-08-04 DIAGNOSIS — H53.9 VISUAL CHANGES: ICD-10-CM

## 2023-08-04 DIAGNOSIS — G43.711 CHRONIC MIGRAINE WITHOUT AURA, INTRACTABLE, WITH STATUS MIGRAINOSUS: ICD-10-CM

## 2023-08-04 PROCEDURE — A9579 GAD-BASE MR CONTRAST NOS,1ML: HCPCS | Performed by: NURSE PRACTITIONER

## 2023-08-04 PROCEDURE — 6360000004 HC RX CONTRAST MEDICATION: Performed by: NURSE PRACTITIONER

## 2023-08-04 PROCEDURE — 70553 MRI BRAIN STEM W/O & W/DYE: CPT

## 2023-08-04 RX ADMIN — GADOTERIDOL 14 ML: 279.3 INJECTION, SOLUTION INTRAVENOUS at 13:16

## 2023-08-15 ENCOUNTER — TELEPHONE (OUTPATIENT)
Age: 48
End: 2023-08-15

## 2023-08-22 ENCOUNTER — TELEPHONE (OUTPATIENT)
Age: 48
End: 2023-08-22

## 2023-08-22 NOTE — TELEPHONE ENCOUNTER
----- Message from Judie Villarreal MA sent at 7/21/2023  9:08 AM EDT -----  New Botox start.  PA for procedure 

## 2023-08-22 NOTE — TELEPHONE ENCOUNTER
Re: Botox    Created case in [employer] for [id number]    Request tracking # [id number]  Ref# [id number], awaiting outcome.

## 2023-09-05 ENCOUNTER — PROCEDURE VISIT (OUTPATIENT)
Age: 48
End: 2023-09-05
Payer: COMMERCIAL

## 2023-09-05 DIAGNOSIS — G43.711 CHRONIC MIGRAINE WITHOUT AURA, INTRACTABLE, WITH STATUS MIGRAINOSUS: Primary | ICD-10-CM

## 2023-09-05 PROCEDURE — 64615 CHEMODENERV MUSC MIGRAINE: CPT | Performed by: NURSE PRACTITIONER

## 2023-09-05 NOTE — PROGRESS NOTES
OFFICE PROCEDURE PROGRESS NOTE        Chart reviewed for the following:   Lionel RUST APRN - NP, have reviewed the History, Physical and updated the Allergic reactions for [address] performed immediately prior to start of procedure:   Lionel RUST APRN - NP, have performed the following reviews on [place] prior to the start of the procedure:            * Patient was identified by name and date of birth   * Agreement on procedure being performed was verified  * Risks and Benefits explained to the patient  * Procedure site verified and marked as necessary  * Patient was positioned for comfort  * Consent was signed and verified     Time: 1000      Date of procedure: 9/5/2023    Procedure performed by:  JOSHUA Tomas NP    Provider assisted by: None    Patient assisted by: None    How tolerated by patient: tolerated the procedure well with no complications    Post Procedural Pain Scale: 2 - Hurts Little Bit    Comments: None      LOT:    [id number]  EXP: 02/2026        Botox Injection Note       Indication: patient has chronic recurrent migraine, has greater than 15 migraine headaches per month, has failed two or more categories of preventatives    Procedure:   Botox concentration: 200 units in 4 ml of preservative-free normal saline. 31 sites injections, distribution as follow      Units/site  Sites Sides Subtotal    Procerus 5 1 1 5    5 1 2 10   Frontalis 5 2 2 20   Temporalis 5 4 2 40   Occipitalis 5 3 2 30   Upper cervical paraspinalis 5 2 2 20   Trapezius 5 3 2 30         200 units Botox were reconstituted, 155 units injected as above and the remainder was unavoidably wasted.      Patient tolerated procedure well.     ________________________  [place]

## 2023-09-08 DIAGNOSIS — G43.909 MIGRAINE, UNSPECIFIED, NOT INTRACTABLE, WITHOUT STATUS MIGRAINOSUS: ICD-10-CM

## 2023-09-08 RX ORDER — RIMEGEPANT SULFATE 75 MG/75MG
TABLET, ORALLY DISINTEGRATING ORAL
Qty: 8 TABLET | Refills: 5 | Status: SHIPPED | OUTPATIENT
Start: 2023-09-08

## 2023-10-18 ENCOUNTER — PATIENT MESSAGE (OUTPATIENT)
Age: 48
End: 2023-10-18

## 2023-10-20 ENCOUNTER — TELEPHONE (OUTPATIENT)
Age: 48
End: 2023-10-20

## 2023-11-18 DIAGNOSIS — G43.909 MIGRAINE, UNSPECIFIED, NOT INTRACTABLE, WITHOUT STATUS MIGRAINOSUS: ICD-10-CM

## 2023-11-20 RX ORDER — ERENUMAB-AOOE 70 MG/ML
INJECTION SUBCUTANEOUS
Qty: 3 ML | Refills: 1 | Status: SHIPPED | OUTPATIENT
Start: 2023-11-20

## 2023-11-30 ENCOUNTER — TELEPHONE (OUTPATIENT)
Age: 48
End: 2023-11-30

## 2023-12-05 ENCOUNTER — PROCEDURE VISIT (OUTPATIENT)
Age: 48
End: 2023-12-05
Payer: COMMERCIAL

## 2023-12-05 ENCOUNTER — TELEPHONE (OUTPATIENT)
Age: 48
End: 2023-12-05

## 2023-12-05 DIAGNOSIS — G43.909 MIGRAINE WITHOUT STATUS MIGRAINOSUS, NOT INTRACTABLE, UNSPECIFIED MIGRAINE TYPE: Primary | ICD-10-CM

## 2023-12-05 PROCEDURE — 64615 CHEMODENERV MUSC MIGRAINE: CPT | Performed by: NURSE PRACTITIONER

## 2023-12-05 RX ORDER — SUMATRIPTAN 100 MG/1
TABLET, FILM COATED ORAL
Qty: 9 TABLET | Refills: 5 | Status: SHIPPED | OUTPATIENT
Start: 2023-12-05

## 2023-12-05 NOTE — PROGRESS NOTES
OFFICE PROCEDURE PROGRESS NOTE        Chart reviewed for the following:   Lionel RUST APRN - NP, have reviewed the History, Physical and updated the Allergic reactions for [address] performed immediately prior to start of procedure:   Lionel RUST APRN - NP, have performed the following reviews on [employer] prior to the start of the procedure:            * Patient was identified by name and date of birth   * Agreement on procedure being performed was verified  * Risks and Benefits explained to the patient  * Procedure site verified and marked as necessary  * Patient was positioned for comfort  * Consent was signed and verified     Time: 1200      Date of procedure: 12/5/2023    Procedure performed by:  JOSHUA Castaneda NP    Provider assisted by: None    Patient assisted by: None    How tolerated by patient: tolerated the procedure well with no complications    Post Procedural Pain Scale: 2 - Hurts Little Bit    Comments: None    LOT:  [id number]  EXP: 04/2026          Botox Injection Note       Indication: patient has chronic recurrent migraine, has 7-10 less migraine days per month with botox injections    Procedure:   Botox concentration: 200 units in 4 ml of preservative-free normal saline. 31 sites injections, distribution as follow      Units/site  Sites Sides Subtotal    Procerus 5 1 1 5    5 1 2 10   Frontalis 5 2 2 20   Temporalis 5 4 2 40   Occipitalis 5 3 2 30   Upper cervical paraspinalis 5 2 2 20   Trapezius 5 3 2 30         200 units Botox were reconstituted, 155 units injected as above and the remainder was unavoidably wasted.      Patient tolerated procedure well.     _____________________________   Renate Chery

## 2023-12-05 NOTE — PROGRESS NOTES
S/w pt. She has never failed any triptan therapies. [facility] and failure. Discussed sumatriptan 100 mg Take one tablet po at HA onset. May repeat in two hours prn for max daily dose of two tablets. Sent to [facility] per pt request.  She will contact office if this does not work and we will send 2nd triptan.

## 2023-12-11 DIAGNOSIS — G43.909 MIGRAINE WITHOUT STATUS MIGRAINOSUS, NOT INTRACTABLE, UNSPECIFIED MIGRAINE TYPE: Primary | ICD-10-CM

## 2023-12-11 RX ORDER — RIZATRIPTAN BENZOATE 5 MG/1
TABLET, ORALLY DISINTEGRATING ORAL
Qty: 9 TABLET | Refills: 4 | Status: SHIPPED | OUTPATIENT
Start: 2023-12-11

## 2024-01-15 ENCOUNTER — PATIENT MESSAGE (OUTPATIENT)
Age: 49
End: 2024-01-15

## 2024-01-15 DIAGNOSIS — G43.711 CHRONIC MIGRAINE WITHOUT AURA, WITH INTRACTABLE MIGRAINE, SO STATED, WITH STATUS MIGRAINOSUS: Primary | ICD-10-CM

## 2024-01-22 ENCOUNTER — TELEPHONE (OUTPATIENT)
Age: 49
End: 2024-01-22

## 2024-01-22 NOTE — TELEPHONE ENCOUNTER
Nurtec PA submitted via CoverMyMeds  (Key: [id number]) - [id number]   Approved    Approved today  PA Case: [id number], Status: Approved, Coverage Starts on: 1/22/2024 12:00:00 AM, Coverage Ends on: 1/21/2025 12:00:00 AM.

## 2024-01-23 RX ORDER — RIMEGEPANT SULFATE 75 MG/75MG
75 TABLET, ORALLY DISINTEGRATING ORAL PRN
Qty: 8 TABLET | Refills: 3 | Status: SHIPPED | OUTPATIENT
Start: 2024-01-23

## 2024-01-25 ENCOUNTER — OFFICE VISIT (OUTPATIENT)
Age: 49
End: 2024-01-25

## 2024-01-25 VITALS
TEMPERATURE: 98.4 F | BODY MASS INDEX: 24.8 KG/M2 | OXYGEN SATURATION: 97 % | SYSTOLIC BLOOD PRESSURE: 111 MMHG | DIASTOLIC BLOOD PRESSURE: 78 MMHG | HEART RATE: 89 BPM | WEIGHT: 177.8 LBS

## 2024-01-25 DIAGNOSIS — T24.231D PARTIAL THICKNESS BURN OF RIGHT LOWER LEG, SUBSEQUENT ENCOUNTER: Primary | ICD-10-CM

## 2024-01-25 RX ORDER — GEL DRESSING 2" X 2"
1 BANDAGE TOPICAL
Qty: 10 EACH | Refills: 0 | Status: SHIPPED | OUTPATIENT
Start: 2024-01-25

## 2024-01-25 RX ORDER — CEPHALEXIN 500 MG/1
500 CAPSULE ORAL 3 TIMES DAILY
Qty: 30 CAPSULE | Refills: 0 | Status: SHIPPED | OUTPATIENT
Start: 2024-01-25 | End: 2024-02-04

## 2024-01-25 NOTE — PATIENT INSTRUCTIONS
Please follow up with your PCP in 2-5 days if your signs and symptoms have not resolved or if they have worsened.    Please go immediately to the Emergency Department if you develop shortness of breath, chest pain or uncontrolled fever above 100.4F.    First degree (superficial thickness) burns - First degree burns (also called superficial burns involve only the top layer of skin.  They are painful, dry and red and ilya when pressed.  These burns do not form a blister and generally heal in three to six days without any scarring.  Non-blistering sunburns are a good example of a first degree skin burn.    SKIN BURN FOLLOW UP  If your burn is not healing, becomes more painful or appears infected (redness spreading greater than 2 cm from the edge of the burn), you should see a health care provider soon.    Most skin burns that are small and first or second degree will heal within one to two weeks and will not usually scar.  In some patients with second degree burns, the skin may become darker or lighter in color, and this will return to normal in 6-9 months.    Home Treatment  Home treatment of a skin burn should include immediately cooling it and stopping the burning process, cleaning the area, preventing infection and managing pain.    Cool the area - The burning process should be stopped immediately.  Once this is done, you can apply a cold compress to the skin or soak the area in cool (not ice) water for a brief period of time to reduce pain and reduce the extent of the burn.  Avoid placing ice directly on the skin because this can damage the skin further.  Avoid soaking the burn longer than 15 minutes.    Clean the area - Remove any clothing from the burned area.  If clothing is stuck to the skin, do not try to remove it and seek emergency medical care.  You can wash the burned skin gently with cool tap water and plain soap.  It is not necessary to disinfect the skin with alcohol, iodine or other cleansers.  In
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
## 2024-01-27 LAB
BACTERIA SPEC CULT: NORMAL
GRAM STN SPEC: NORMAL
GRAM STN SPEC: NORMAL
SERVICE CMNT-IMP: NORMAL

## 2024-01-29 ENCOUNTER — HOSPITAL ENCOUNTER (OUTPATIENT)
Facility: HOSPITAL | Age: 49
Discharge: HOME | End: 2024-01-29
Attending: PODIATRIST
Payer: COMMERCIAL

## 2024-01-29 VITALS
DIASTOLIC BLOOD PRESSURE: 66 MMHG | RESPIRATION RATE: 16 BRPM | SYSTOLIC BLOOD PRESSURE: 124 MMHG | BODY MASS INDEX: 24.5 KG/M2 | HEIGHT: 71 IN | WEIGHT: 175 LBS | TEMPERATURE: 97.2 F | HEART RATE: 69 BPM

## 2024-01-29 DIAGNOSIS — L97.912 ULCER OF RIGHT LEG, WITH FAT LAYER EXPOSED (HCC): Primary | ICD-10-CM

## 2024-01-29 PROCEDURE — 99214 OFFICE O/P EST MOD 30 MIN: CPT

## 2024-01-29 RX ORDER — LIDOCAINE HYDROCHLORIDE 40 MG/ML
SOLUTION TOPICAL ONCE
OUTPATIENT
Start: 2024-01-29 | End: 2024-01-29

## 2024-01-29 RX ORDER — BACITRACIN ZINC AND POLYMYXIN B SULFATE 500; 1000 [USP'U]/G; [USP'U]/G
OINTMENT TOPICAL ONCE
OUTPATIENT
Start: 2024-01-29 | End: 2024-01-29

## 2024-01-29 RX ORDER — BETAMETHASONE DIPROPIONATE 0.5 MG/G
CREAM TOPICAL ONCE
OUTPATIENT
Start: 2024-01-29 | End: 2024-01-29

## 2024-01-29 RX ORDER — LIDOCAINE 50 MG/G
OINTMENT TOPICAL ONCE
OUTPATIENT
Start: 2024-01-29 | End: 2024-01-29

## 2024-01-29 RX ORDER — GENTAMICIN SULFATE 1 MG/G
OINTMENT TOPICAL ONCE
OUTPATIENT
Start: 2024-01-29 | End: 2024-01-29

## 2024-01-29 RX ORDER — TRIAMCINOLONE ACETONIDE 1 MG/G
OINTMENT TOPICAL ONCE
OUTPATIENT
Start: 2024-01-29 | End: 2024-01-29

## 2024-01-29 RX ORDER — LIDOCAINE HYDROCHLORIDE 20 MG/ML
JELLY TOPICAL ONCE
OUTPATIENT
Start: 2024-01-29 | End: 2024-01-29

## 2024-01-29 RX ORDER — LIDOCAINE 40 MG/G
CREAM TOPICAL ONCE
OUTPATIENT
Start: 2024-01-29 | End: 2024-01-29

## 2024-01-29 RX ORDER — SODIUM CHLOR/HYPOCHLOROUS ACID 0.033 %
SOLUTION, IRRIGATION IRRIGATION ONCE
OUTPATIENT
Start: 2024-01-29 | End: 2024-01-29

## 2024-01-29 RX ORDER — GINSENG 100 MG
CAPSULE ORAL ONCE
OUTPATIENT
Start: 2024-01-29 | End: 2024-01-29

## 2024-01-29 RX ORDER — CLOBETASOL PROPIONATE 0.5 MG/G
OINTMENT TOPICAL ONCE
OUTPATIENT
Start: 2024-01-29 | End: 2024-01-29

## 2024-01-29 RX ORDER — IBUPROFEN 200 MG
TABLET ORAL ONCE
OUTPATIENT
Start: 2024-01-29 | End: 2024-01-29

## 2024-01-29 ASSESSMENT — PAIN SCALES - GENERAL: PAINLEVEL_OUTOF10: 6

## 2024-01-29 ASSESSMENT — PAIN DESCRIPTION - LOCATION: LOCATION: LEG

## 2024-01-29 ASSESSMENT — PAIN DESCRIPTION - ORIENTATION: ORIENTATION: RIGHT

## 2024-01-29 ASSESSMENT — PAIN DESCRIPTION - DESCRIPTORS: DESCRIPTORS: THROBBING
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
## 2024-01-29 NOTE — PATIENT INSTRUCTIONS
Discharge Instructions for  [facility]  [address] [place] [address]  Telephone: [phone]     FAX [phone]    Wound Care Center Information: Should you experience any significant changes in your wound(s) or have questions about your wound care, please contact the [facility] at MONDAY - FRIDAY 8:00 am - 4:30.  If you need help with your wound outside these hours and cannot wait until we are again available, contact your PCP or go to the hospital emergency room.     NAME:  Griselda Smith  YOB: 1975  DATE:  1/29/2024    : Siri     []    Wound Cleansing:   Do not scrub or use excessive force.  Cleanse wound prior to applying a clean dressing with:  [] Normal Saline   [x] Keep Wound Dry in Shower - may purchase a cast cover at local pharmacy     [] Cleanse wound with Mild Soap & Water    [] May Shower: remove dressing 1st, wash with mild soap and water, pat dry, and redress wound right after with a new dressing  [] Do not shower  [] cleanse with baby shampoo lather leave 2-3 then rinse with water    Topical Treatments:  Do not apply lotions, creams, or ointments to wound bed unless directed.   [] Apply moisturizing lotion to skin surrounding the wound prior to dressing change.  [] Other:     Dressings:                   Wound Location Right Knee and Medial Leg wound     Apply Primary Dressing:      [x] Melani Collagen - speckled lightly into wound beds    Cover and Secure with:  [x] Gauze [] ABD [] exudry     [] Yolis [x] Kerlix [] Mepilex Border  [] Ace Wrap [x] Roll Tape   [x] Other: Netting     Change dressing:   [] Daily      [x] Every Other Day   [] Three times per week  [] Once a week   [] Do Not Change Dressing     [] Other:     Edema Control: Every morning immediately when getting up should be applied to affected leg(s) from mid foot to knee making sure to cover the heel.  Remove every night before going to

## 2024-01-29 NOTE — FLOWSHEET NOTE
Margins Flat/open edges   Wound Thickness Description not for Pressure Injury Partial thickness   Wound 01/29/24 Leg Right;Medial;Mid #3   Date First Assessed/Time First Assessed: 01/29/24 0920   Present on Original Admission: Yes  Wound Approximate Age at First Assessment (Weeks): 1 weeks  Primary Wound Type: Burn  Location: Leg  Wound Location Orientation: Right;Medial;Mid  Wound Descr...   Wound Image    Wound Etiology Burn   Dressing Status Old drainage noted   Wound Cleansed Cleansed with saline   Wound Length (cm) 2.3 cm   Wound Width (cm) 7.1 cm   Wound Depth (cm) 0.1 cm   Wound Surface Area (cm^2) 16.33 cm^2   Wound Volume (cm^3) 1.633 cm^3   Wound Assessment Slough;Pink/red;Epithelialization   Drainage Amount Moderate (25-50%)   Drainage Description Serosanguinous   Odor None   Piedad-wound Assessment Blanchable erythema   Margins Flat/open edges   Wound Thickness Description not for Pressure Injury Partial thickness   Wound 01/29/24 Leg Proximal;Right #4   Date First Assessed/Time First Assessed: 01/29/24 0920   Present on Original Admission: Yes  Wound Approximate Age at First Assessment (Weeks): 1 weeks  Primary Wound Type: Burn  Location: Leg  Wound Location Orientation: Proximal;Right  Wound Descrip...   Wound Image    Wound Etiology Burn   Dressing Status Old drainage noted   Wound Cleansed Cleansed with saline   Wound Length (cm) 1 cm   Wound Width (cm) 5 cm   Wound Depth (cm) 0.1 cm   Wound Surface Area (cm^2) 5 cm^2   Wound Volume (cm^3) 0.5 cm^3   Wound Assessment Slough;Pink/red;Epithelialization   Drainage Amount Moderate (25-50%)   Drainage Description Serosanguinous   Odor None   Piedad-wound Assessment Blanchable erythema   Margins Flat/open edges   Wound Thickness Description not for Pressure Injury Partial thickness   Wound 01/29/24 Knee Right #5   Date First Assessed/Time First Assessed: 01/29/24 0920   Present on Original Admission: Yes  Wound Approximate Age at First Assessment (Weeks): 1  No

## 2024-01-29 NOTE — WOUND CARE
non-face-to-face service time visit on the date of service such as  Preparing to see the patient (eg, review of tests)  Obtaining and/or reviewing separately obtained history  Performing a medically necessary appropriate examination and/or evaluation  Counseling and educating the patient/family/caregiver  Ordering medications, tests, or procedures  Referring and communicating with other health care professionals as needed  Documenting clinical information in the electronic or other health record  Independently interpreting results (not reported separately) and communicating results to the patient/family/caregiver  Care coordination (not reported separately)    Wound 01/29/24 Leg Right;Distal;Medial #1 (Active)   Wound Image   01/29/24 0921   Wound Etiology Burn 01/29/24 0921   Dressing Status Old drainage noted 01/29/24 0921   Wound Cleansed Cleansed with saline 01/29/24 0921   Dressing/Treatment Collagen with Ag;Heat applied;Petroleum impregnated gauze;Gauze dressing/dressing sponge;Roll gauze;Tape/Soft cloth adhesive tape 01/29/24 0944   Wound Length (cm) 1.6 cm 01/29/24 0921   Wound Width (cm) 6.4 cm 01/29/24 0921   Wound Depth (cm) 0.1 cm 01/29/24 0921   Wound Surface Area (cm^2) 10.24 cm^2 01/29/24 0921   Wound Volume (cm^3) 1.024 cm^3 01/29/24 0921   Wound Assessment Slough;Pink/red;Epithelialization 01/29/24 0921   Drainage Amount Moderate (25-50%) 01/29/24 0921   Drainage Description Serous;Serosanguinous 01/29/24 0921   Odor None 01/29/24 0921   Piedad-wound Assessment Blanchable erythema 01/29/24 0921   Margins Flat/open edges 01/29/24 0921   Wound Thickness Description not for Pressure Injury Partial thickness 01/29/24 0921   Number of days: 0       Wound 01/29/24 Leg Posterior;Right;Distal #2 (Active)   Wound Image   01/29/24 0921   Wound Etiology Burn 01/29/24 0921   Dressing Status Old drainage noted 01/29/24 0921   Wound Cleansed Cleansed with saline 01/29/24 0921   Dressing/Treatment Collagen with

## 2024-02-05 ENCOUNTER — HOSPITAL ENCOUNTER (OUTPATIENT)
Facility: HOSPITAL | Age: 49
Discharge: HOME | End: 2024-02-05
Attending: PODIATRIST
Payer: COMMERCIAL

## 2024-02-05 VITALS
RESPIRATION RATE: 16 BRPM | HEART RATE: 74 BPM | DIASTOLIC BLOOD PRESSURE: 76 MMHG | TEMPERATURE: 97.2 F | SYSTOLIC BLOOD PRESSURE: 112 MMHG

## 2024-02-05 DIAGNOSIS — L97.912 ULCER OF RIGHT LEG, WITH FAT LAYER EXPOSED (HCC): Primary | ICD-10-CM

## 2024-02-05 PROCEDURE — 99213 OFFICE O/P EST LOW 20 MIN: CPT

## 2024-02-05 RX ORDER — BETAMETHASONE DIPROPIONATE 0.5 MG/G
CREAM TOPICAL ONCE
OUTPATIENT
Start: 2024-02-05 | End: 2024-02-05

## 2024-02-05 RX ORDER — LIDOCAINE HYDROCHLORIDE 40 MG/ML
SOLUTION TOPICAL ONCE
OUTPATIENT
Start: 2024-02-05 | End: 2024-02-05

## 2024-02-05 RX ORDER — GINSENG 100 MG
CAPSULE ORAL ONCE
OUTPATIENT
Start: 2024-02-05 | End: 2024-02-05

## 2024-02-05 RX ORDER — LIDOCAINE 50 MG/G
OINTMENT TOPICAL ONCE
OUTPATIENT
Start: 2024-02-05 | End: 2024-02-05

## 2024-02-05 RX ORDER — IBUPROFEN 200 MG
TABLET ORAL ONCE
OUTPATIENT
Start: 2024-02-05 | End: 2024-02-05

## 2024-02-05 RX ORDER — LIDOCAINE 40 MG/G
CREAM TOPICAL ONCE
OUTPATIENT
Start: 2024-02-05 | End: 2024-02-05

## 2024-02-05 RX ORDER — GENTAMICIN SULFATE 1 MG/G
OINTMENT TOPICAL ONCE
OUTPATIENT
Start: 2024-02-05 | End: 2024-02-05

## 2024-02-05 RX ORDER — BACITRACIN ZINC AND POLYMYXIN B SULFATE 500; 1000 [USP'U]/G; [USP'U]/G
OINTMENT TOPICAL ONCE
OUTPATIENT
Start: 2024-02-05 | End: 2024-02-05

## 2024-02-05 RX ORDER — LIDOCAINE HYDROCHLORIDE 20 MG/ML
JELLY TOPICAL ONCE
OUTPATIENT
Start: 2024-02-05 | End: 2024-02-05

## 2024-02-05 RX ORDER — SODIUM CHLOR/HYPOCHLOROUS ACID 0.033 %
SOLUTION, IRRIGATION IRRIGATION ONCE
OUTPATIENT
Start: 2024-02-05 | End: 2024-02-05

## 2024-02-05 RX ORDER — TRIAMCINOLONE ACETONIDE 1 MG/G
OINTMENT TOPICAL ONCE
OUTPATIENT
Start: 2024-02-05 | End: 2024-02-05

## 2024-02-05 RX ORDER — CLOBETASOL PROPIONATE 0.5 MG/G
OINTMENT TOPICAL ONCE
OUTPATIENT
Start: 2024-02-05 | End: 2024-02-05

## 2024-02-05 NOTE — PATIENT INSTRUCTIONS
Discharge Instructions for  [facility]  [address] [place] [address]  Telephone: [phone]     FAX [phone]    Wound Care Center Information: Should you experience any significant changes in your wound(s) or have questions about your wound care, please contact the [facility] at MONDAY - FRIDAY 8:00 am - 4:30.  If you need help with your wound outside these hours and cannot wait until we are again available, contact your PCP or go to the hospital emergency room.     NAME:  Griselda Smith  YOB: 1975  DATE:  2/5/2024    : Siri     [x] DME: [employer]    Wound Cleansing:   Do not scrub or use excessive force.  Cleanse wound prior to applying a clean dressing with:  [] Normal Saline   [x] Keep Wound Dry in Shower - may purchase a cast cover at local pharmacy     [] Cleanse wound with Mild Soap & Water    [] May Shower: remove dressing 1st, wash with mild soap and water, pat dry, and redress wound right after with a new dressing  [] Do not shower  [] cleanse with baby shampoo lather leave 2-3 then rinse with water    Topical Treatments:  Do not apply lotions, creams, or ointments to wound bed unless directed.   [x] Apply moisturizing lotion such as A&D ointment to skin surrounding the wound and to healed skin prior to dressing change.  [] Other:     Dressings:                   Wound Location Right Knee     Apply Primary Dressing:      [x] Hydrogel, Adaptic, Melani Collagen - speckled lightly into wound beds    Cover and Secure with:  [x] Gauze [] ABD [] exudry     [] Yolis [x] Kerlix [] Mepilex Border  [] Ace Wrap [x] Roll Tape   [x] Other: Netting     Change dressing:   [] Daily      [x] Every Other Day   [] Three times per week  [] Once a week   [] Do Not Change Dressing     [] Other:     Edema Control: Every morning immediately when getting up should be applied to affected leg(s) from mid foot to knee making

## 2024-02-05 NOTE — FLOWSHEET NOTE
02/05/24 1055   Anesthetic   Anesthetic 4% Lidocaine Liquid Topical   Right Leg Edema Point of Measurement   Leg circumference 36 cm   Ankle circumference 21.5 cm   RLE Neurovascular Assessment   Capillary Refill Less than/Equal to 3 seconds   Color Appropriate for Ethnicity   Temperature Cool   R Pedal Pulse +2   Wound 01/29/24 Leg Right;Distal;Medial #1   Date First Assessed/Time First Assessed: 01/29/24 0916   Present on Original Admission: Yes  Wound Approximate Age at First Assessment (Weeks): 2 weeks  Primary Wound Type: Burn  Location: Leg  Wound Location Orientation: Right;Distal;Medial  Wound De...   Wound Image    Wound Etiology Burn   Wound Cleansed Cleansed with saline   Wound Length (cm) 0.1 cm   Wound Width (cm) 0.1 cm   Wound Depth (cm) 0.1 cm   Wound Surface Area (cm^2) 0.01 cm^2   Change in Wound Size % (l*w) 99.9   Wound Volume (cm^3) 0.001 cm^3   Wound Healing % 100   Wound Assessment Pink/red   Drainage Amount Scant (moist but unmeasurable)   Drainage Description Serosanguinous   Odor None   Piedad-wound Assessment Blanchable erythema   Margins Flat/open edges   Wound 01/29/24 Leg Posterior;Right;Distal #2   Date First Assessed/Time First Assessed: 01/29/24 0919   Present on Original Admission: Yes  Wound Approximate Age at First Assessment (Weeks): 1 weeks  Primary Wound Type: Burn  Location: Leg  Wound Location Orientation: Posterior;Right;Distal  Wound...   Wound Image    Wound Etiology Burn   Wound Cleansed Cleansed with saline   Wound Length (cm) 0.1 cm   Wound Width (cm) 0.1 cm   Wound Depth (cm) 0.1 cm   Wound Surface Area (cm^2) 0.01 cm^2   Change in Wound Size % (l*w) 99.86   Wound Volume (cm^3) 0.001 cm^3   Wound Healing % 100   Wound Assessment Pink/red   Drainage Amount Scant (moist but unmeasurable)   Drainage Description Serosanguinous   Odor None   Piedad-wound Assessment Blanchable erythema   Margins Flat/open edges   Wound 01/29/24 Leg Right;Medial;Mid #3   Date First

## 2024-02-05 NOTE — PLAN OF CARE
Wound Care Supplies      Supply Company:     [employer]     Ordering Center:     [facility]  [address]  [place] [facility] [address] [phone]  WOUND CARE Dept: [phone]   FAX NUMBER     Patient Information:      Fredy Smith  [address]  [facility] [address]   [phone]   : 1975  AGE: 48 y.o.     GENDER: female   EPISODE DATE: 2024    Insurance:      PRIMARY INSURANCE:  [place] [employer] [place]  Coverage[place] BCBS  Effective Date: 2022  Group Number: [unfilled]  Subscriber Number: [id number] - ([place] [employer])    Payer/Plan Subscr  Sex Relation Sub. Ins. ID Effective Group Num   1. BCBS - LAMINFREDY YAN 1975 Female Self [id number]                                   [address] [address]       Patient Wound Information:      Problem List Items Addressed This Visit          Other    Ulcer of right leg, with fat layer exposed (HCC) - Primary    Relevant Orders    Initiate Outpatient Wound Care Protocol       WOUNDS REQUIRING DRESSING SUPPLIES:     Wound 24 Knee Right #5 (Active)   Wound Image   24 1055   Wound Etiology Burn 24 1055   Dressing Status Old drainage noted 24 0921   Wound Cleansed Cleansed with saline 24 1055   Dressing/Treatment Collagen with Ag;Petroleum impregnated gauze;Gauze dressing/dressing sponge;Roll gauze;Tape/Soft cloth adhesive tape;Other (comment) 24 1127   Wound Length (cm) 2 cm 24 1055   Wound Width (cm) 7 cm 24 1055   Wound Depth (cm) 0.2 cm 24 1055   Wound Surface Area (cm^2) 14 cm^2 24 1055   Change in Wound Size % (l*w) 25.77 24 1055   Wound Volume (cm^3) 2.8 cm^3 24 1055   Wound Healing % 26 24 1055   Wound Assessment Pink/red;Slough;Epithelialization 24 1055   Drainage Amount Moderate (25-50%) 24 1055   Drainage Description Serosanguinous 24 1055   Odor None 24 1055   Piedad-wound Assessment Blanchable erythema

## 2024-02-05 NOTE — FLOWSHEET NOTE
02/05/24 1127   Wound 01/29/24 Knee Right #5   Date First Assessed/Time First Assessed: 01/29/24 0920   Present on Original Admission: Yes  Wound Approximate Age at First Assessment (Weeks): 1 weeks  Primary Wound Type: Burn  Location: Knee  Wound Location Orientation: Right  Wound Description (Co...   Dressing/Treatment Collagen with Ag;Petroleum impregnated gauze;Gauze dressing/dressing sponge;Roll gauze;Tape/Soft cloth adhesive tape;Other (comment)  (hydrogel, netting)     Discharge Condition: Stable    Pain: 1    Ambulatory Status: None    Discharge Destination: home    Transportation:car    Accompanied by: SELF    Discharge instructions reviewed with SELF and copy or written instructions have been provided. All questions/concerns have been addressed at this time.

## 2024-02-05 NOTE — WOUND CARE
E/M Time spent with patient and/or patient care issues: [x] 15-20 min  [] 21-30 min  [] 31-44 min  [] 45 min or more.   This is above the usual time needed to address patient's chief complaint today: [] Yes  [] No  This time includes physician non-face-to-face service time visit on the date of service such as  Preparing to see the patient (eg, review of tests)  Obtaining and/or reviewing separately obtained history  Performing a medically necessary appropriate examination and/or evaluation  Counseling and educating the patient/family/caregiver  Ordering medications, tests, or procedures  Referring and communicating with other health care professionals as needed  Documenting clinical information in the electronic or other health record  Independently interpreting results (not reported separately) and communicating results to the patient/family/caregiver  Care coordination (not reported separately)    Objective:    /76   Pulse 74   Temp 97.2 °F (36.2 °C) (Temporal)   Resp 16   LMP  (LMP Unknown)   Wt Readings from Last 3 Encounters:   01/29/24 79.4 kg (175 lb)   01/25/24 80.6 kg (177 lb 12.8 oz)   10/17/22 80.7 kg (178 lb)       PHYSICAL EXAM  General: Alert and in no acute distress. Normal appearing  Skin: Warm and dry, no rash  Head: Normocephalic and atraumatic  Eyes: Extraocular eye movements intact, conjunctivae normal, and sclera anicteric  ENT: Hearing grossly normal bilaterally. Normal appearance  Respiratory: no chest wall tenderness. no respiratory distress  GI: Abdomen non-tender and benign  Musculoskeletal: Baseline range of motion in joints. Nontender calves. No cyanosis. Edema negative.  Neurologic: Speech normal. At baseline without new focal deficits. Mental status normal or at baseline    PAST MEDICAL HISTORY        Diagnosis Date    Acne     Spirolactone for this    Anxiety     Migraine     Psychiatric disorder     OCD       PAST SURGICAL HISTORY    Past Surgical History:   Procedure

## 2024-02-12 ENCOUNTER — HOSPITAL ENCOUNTER (OUTPATIENT)
Facility: HOSPITAL | Age: 49
Discharge: HOME | End: 2024-02-12
Attending: PODIATRIST
Payer: COMMERCIAL

## 2024-02-12 VITALS
HEART RATE: 84 BPM | DIASTOLIC BLOOD PRESSURE: 82 MMHG | RESPIRATION RATE: 17 BRPM | TEMPERATURE: 97.2 F | SYSTOLIC BLOOD PRESSURE: 122 MMHG

## 2024-02-12 DIAGNOSIS — L97.912 ULCER OF RIGHT LEG, WITH FAT LAYER EXPOSED (HCC): Primary | ICD-10-CM

## 2024-02-12 PROCEDURE — 11042 DBRDMT SUBQ TIS 1ST 20SQCM/<: CPT

## 2024-02-12 RX ORDER — LIDOCAINE HYDROCHLORIDE 40 MG/ML
SOLUTION TOPICAL ONCE
OUTPATIENT
Start: 2024-02-12 | End: 2024-02-12

## 2024-02-12 RX ORDER — IBUPROFEN 200 MG
TABLET ORAL ONCE
OUTPATIENT
Start: 2024-02-12 | End: 2024-02-12

## 2024-02-12 RX ORDER — BACITRACIN ZINC AND POLYMYXIN B SULFATE 500; 1000 [USP'U]/G; [USP'U]/G
OINTMENT TOPICAL ONCE
OUTPATIENT
Start: 2024-02-12 | End: 2024-02-12

## 2024-02-12 RX ORDER — LIDOCAINE 40 MG/G
CREAM TOPICAL ONCE
OUTPATIENT
Start: 2024-02-12 | End: 2024-02-12

## 2024-02-12 RX ORDER — LIDOCAINE HYDROCHLORIDE 20 MG/ML
JELLY TOPICAL ONCE
OUTPATIENT
Start: 2024-02-12 | End: 2024-02-12

## 2024-02-12 RX ORDER — LIDOCAINE 50 MG/G
OINTMENT TOPICAL ONCE
OUTPATIENT
Start: 2024-02-12 | End: 2024-02-12

## 2024-02-12 RX ORDER — CLOBETASOL PROPIONATE 0.5 MG/G
OINTMENT TOPICAL ONCE
OUTPATIENT
Start: 2024-02-12 | End: 2024-02-12

## 2024-02-12 RX ORDER — GENTAMICIN SULFATE 1 MG/G
OINTMENT TOPICAL ONCE
OUTPATIENT
Start: 2024-02-12 | End: 2024-02-12

## 2024-02-12 RX ORDER — BETAMETHASONE DIPROPIONATE 0.5 MG/G
CREAM TOPICAL ONCE
OUTPATIENT
Start: 2024-02-12 | End: 2024-02-12

## 2024-02-12 RX ORDER — TRIAMCINOLONE ACETONIDE 1 MG/G
OINTMENT TOPICAL ONCE
OUTPATIENT
Start: 2024-02-12 | End: 2024-02-12

## 2024-02-12 RX ORDER — SODIUM CHLOR/HYPOCHLOROUS ACID 0.033 %
SOLUTION, IRRIGATION IRRIGATION ONCE
OUTPATIENT
Start: 2024-02-12 | End: 2024-02-12

## 2024-02-12 RX ORDER — GINSENG 100 MG
CAPSULE ORAL ONCE
OUTPATIENT
Start: 2024-02-12 | End: 2024-02-12

## 2024-02-12 NOTE — PATIENT INSTRUCTIONS
Discharge Instructions for  [facility]  [address] [place] [address]  Telephone: [phone]     FAX [phone]    Wound Care Center Information: Should you experience any significant changes in your wound(s) or have questions about your wound care, please contact the [facility] at MONDAY - FRIDAY 8:00 am - 4:30.  If you need help with your wound outside these hours and cannot wait until we are again available, contact your PCP or go to the hospital emergency room.     NAME:  Griselda Smith  YOB: 1975  DATE:  2/12/2024    : Siri     [x] DME: [employer]    Wound Cleansing:   Do not scrub or use excessive force.  Cleanse wound prior to applying a clean dressing with:  [] Normal Saline   [x] Keep Wound Dry in Shower - may purchase a cast cover at local pharmacy     [] Cleanse wound with Mild Soap & Water    [] May Shower: remove dressing 1st, wash with mild soap and water, pat dry, and redress wound right after with a new dressing  [] Do not shower  [] cleanse with baby shampoo lather leave 2-3 then rinse with water    Topical Treatments:  Do not apply lotions, creams, or ointments to wound bed unless directed.   [x] Apply moisturizing lotion such as A&D ointment to skin surrounding the wound and to healed skin prior to dressing change.  [] Other:     Dressings:                   Wound Location Right Knee     Apply Primary Dressing:      [x] Hydrogel, Adaptic, Melani Collagen - speckled lightly into wound beds    Cover and Secure with:  [x] Gauze [] ABD [] exudry     [] Yolis [x] Kerlix [] Mepilex Border  [] Ace Wrap [x] Roll Tape   [x] Other: Netting     Change dressing:   [] Daily      [x] Every Other Day   [] Three times per week  [] Once a week   [] Do Not Change Dressing     [] Other:     Edema Control: Every morning immediately when getting up should be applied to affected leg(s) from mid foot to knee making

## 2024-02-12 NOTE — FLOWSHEET NOTE
02/12/24 1014   Wound 01/29/24 Knee Right #5   Date First Assessed/Time First Assessed: 01/29/24 0920   Present on Original Admission: Yes  Wound Approximate Age at First Assessment (Weeks): 1 weeks  Primary Wound Type: Burn  Location: Knee  Wound Location Orientation: Right  Wound Description (Co...   Dressing/Treatment Collagen with Ag;Petroleum impregnated gauze;Other (comment);Tape/Soft cloth adhesive tape;Roll gauze;Gauze dressing/dressing sponge  (hydrogel, netting)     Discharge Condition: Stable    Pain: 0    Ambulatory Status: None    Discharge Destination: home    Transportation:car    Accompanied by: SELF    Discharge instructions reviewed with SELF and copy or written instructions have been provided. All questions/concerns have been addressed at this time.

## 2024-02-12 NOTE — WOUND CARE
Procedure Note  Indications: Based on my examination of this patient's wound(s)/ulcer(s) today, debridement is required to promote healing and evaluate the wound base.    Debridement: Excisional Debridement    Using: curette the wound(s)/ulcer(s) was/were debrided down through and including the removal of subcutaneous tissue.  Performed by: Alison Bray DPM  Consent obtained: Yes  Time out taken: No:   Pain Control: Anesthetic  Anesthetic: 4% Lidocaine Liquid Topical       Devitalized Tissue Debrided: slough    Pre Debridement Measurements:  Are located in the Wound/Ulcer Documentation Flow Sheet    Diabetic/Pressure/Non Pressure Ulcers only:  Ulcer: Non-Pressure ulcer, fat layer exposed     Wound/Ulcer #: 1  Post Debridement Measurements:  Wound/Ulcer Descriptions are Pre Debridement except measurements:  Wound 01/29/24 Knee Right #5 (Active)   Wound Image   02/12/24 0945   Wound Etiology Burn 02/05/24 1055   Dressing Status Old drainage noted 02/12/24 0945   Wound Cleansed Cleansed with saline 02/12/24 0945   Dressing/Treatment Collagen with Ag;Petroleum impregnated gauze;Other (comment);Tape/Soft cloth adhesive tape;Roll gauze;Gauze dressing/dressing sponge 02/12/24 1014   Wound Length (cm) 2.5 cm 02/12/24 0945   Wound Width (cm) 7.3 cm 02/12/24 0945   Wound Depth (cm) 0.1 cm 02/12/24 0945   Wound Surface Area (cm^2) 18.25 cm^2 02/12/24 0945   Change in Wound Size % (l*w) 3.23 02/12/24 0945   Wound Volume (cm^3) 1.825 cm^3 02/12/24 0945   Wound Healing % 52 02/12/24 0945   Post-Procedure Length (cm) 2.5 cm 02/12/24 1006   Post-Procedure Width (cm) 7.3 cm 02/12/24 1006   Post-Procedure Depth (cm) 0.2 cm 02/12/24 1006   Post-Procedure Surface Area (cm^2) 18.25 cm^2 02/12/24 1006   Post-Procedure Volume (cm^3) 3.65 cm^3 02/12/24 1006   Wound Assessment Slough;Pink/red;Epithelialization;Hyper granulation tissue 02/12/24 0945   Drainage Amount Moderate (25-50%) 02/12/24 0945   Drainage Description Serosanguinous

## 2024-02-12 NOTE — FLOWSHEET NOTE
02/12/24 0945   Anesthetic   Anesthetic 4% Lidocaine Liquid Topical   Right Leg Edema Point of Measurement   Leg circumference 36 cm   Ankle circumference 21.5 cm   RLE Neurovascular Assessment   Capillary Refill Less than/Equal to 3 seconds   Color Appropriate for Ethnicity   Temperature Cool   R Pedal Pulse +2   Wound 01/29/24 Knee Right #5   Date First Assessed/Time First Assessed: 01/29/24 0920   Present on Original Admission: Yes  Wound Approximate Age at First Assessment (Weeks): 1 weeks  Primary Wound Type: Burn  Location: Knee  Wound Location Orientation: Right  Wound Description (Co...   Wound Image    Dressing Status Old drainage noted   Wound Cleansed Cleansed with saline   Wound Length (cm) 2.5 cm   Wound Width (cm) 7.3 cm   Wound Depth (cm) 0.1 cm   Wound Surface Area (cm^2) 18.25 cm^2   Change in Wound Size % (l*w) 3.23   Wound Volume (cm^3) 1.825 cm^3   Wound Healing % 52   Wound Assessment Slough;Pink/red;Epithelialization;Hyper granulation tissue   Drainage Amount Moderate (25-50%)   Drainage Description Serosanguinous   Odor None   Piedad-wound Assessment Blanchable erythema   Margins Flat/open edges   Wound Thickness Description not for Pressure Injury Full thickness     /82   Pulse 84   Temp 97.2 °F (36.2 °C) (Temporal)   Resp 17   LMP  (LMP Unknown)

## 2024-02-23 ENCOUNTER — TELEPHONE (OUTPATIENT)
Age: 49
End: 2024-02-23

## 2024-02-23 NOTE — TELEPHONE ENCOUNTER
Drug Acquisition: BUY AND BILL  Drug: BOTOX 200 units Dx: G43.711  Insurance: [place]  Submission Type: Availity  [facility]:   Request Tracking ID: [id number]   Reference #: [id number]   CPT: 99856  Request Tracking ID: [id number]   Reference #: [id number]   Approval Range: 02/23/24 to 08/20/24

## 2024-02-26 ENCOUNTER — HOSPITAL ENCOUNTER (OUTPATIENT)
Facility: HOSPITAL | Age: 49
Discharge: HOME | End: 2024-02-26
Attending: PODIATRIST
Payer: COMMERCIAL

## 2024-02-26 VITALS
SYSTOLIC BLOOD PRESSURE: 122 MMHG | DIASTOLIC BLOOD PRESSURE: 67 MMHG | RESPIRATION RATE: 16 BRPM | HEART RATE: 69 BPM | TEMPERATURE: 97.9 F

## 2024-02-26 DIAGNOSIS — L97.912 ULCER OF RIGHT LEG, WITH FAT LAYER EXPOSED (HCC): Primary | ICD-10-CM

## 2024-02-26 PROCEDURE — 11042 DBRDMT SUBQ TIS 1ST 20SQCM/<: CPT

## 2024-02-26 RX ORDER — LIDOCAINE 50 MG/G
OINTMENT TOPICAL ONCE
OUTPATIENT
Start: 2024-02-26 | End: 2024-02-26

## 2024-02-26 RX ORDER — IBUPROFEN 200 MG
TABLET ORAL ONCE
OUTPATIENT
Start: 2024-02-26 | End: 2024-02-26

## 2024-02-26 RX ORDER — BACITRACIN ZINC AND POLYMYXIN B SULFATE 500; 1000 [USP'U]/G; [USP'U]/G
OINTMENT TOPICAL ONCE
OUTPATIENT
Start: 2024-02-26 | End: 2024-02-26

## 2024-02-26 RX ORDER — CLOBETASOL PROPIONATE 0.5 MG/G
OINTMENT TOPICAL ONCE
OUTPATIENT
Start: 2024-02-26 | End: 2024-02-26

## 2024-02-26 RX ORDER — GENTAMICIN SULFATE 1 MG/G
OINTMENT TOPICAL ONCE
OUTPATIENT
Start: 2024-02-26 | End: 2024-02-26

## 2024-02-26 RX ORDER — GINSENG 100 MG
CAPSULE ORAL ONCE
OUTPATIENT
Start: 2024-02-26 | End: 2024-02-26

## 2024-02-26 RX ORDER — LIDOCAINE HYDROCHLORIDE 40 MG/ML
SOLUTION TOPICAL ONCE
OUTPATIENT
Start: 2024-02-26 | End: 2024-02-26

## 2024-02-26 RX ORDER — BETAMETHASONE DIPROPIONATE 0.5 MG/G
CREAM TOPICAL ONCE
OUTPATIENT
Start: 2024-02-26 | End: 2024-02-26

## 2024-02-26 RX ORDER — LIDOCAINE HYDROCHLORIDE 20 MG/ML
JELLY TOPICAL ONCE
OUTPATIENT
Start: 2024-02-26 | End: 2024-02-26

## 2024-02-26 RX ORDER — LIDOCAINE 40 MG/G
CREAM TOPICAL ONCE
OUTPATIENT
Start: 2024-02-26 | End: 2024-02-26

## 2024-02-26 RX ORDER — SODIUM CHLOR/HYPOCHLOROUS ACID 0.033 %
SOLUTION, IRRIGATION IRRIGATION ONCE
OUTPATIENT
Start: 2024-02-26 | End: 2024-02-26

## 2024-02-26 RX ORDER — TRIAMCINOLONE ACETONIDE 1 MG/G
OINTMENT TOPICAL ONCE
OUTPATIENT
Start: 2024-02-26 | End: 2024-02-26

## 2024-02-26 ASSESSMENT — PAIN DESCRIPTION - DESCRIPTORS: DESCRIPTORS: THROBBING

## 2024-02-26 ASSESSMENT — PAIN SCALES - GENERAL: PAINLEVEL_OUTOF10: 1

## 2024-02-26 NOTE — WOUND CARE
Procedure Note  Indications: Based on my examination of this patient's wound(s)/ulcer(s) today, debridement is required to promote healing and evaluate the wound base.    Debridement: Excisional Debridement    Using: curette the wound(s)/ulcer(s) was/were debrided down through and including the removal of subcutaneous tissue.  Performed by: Alison Bray DPM  Consent obtained: Yes  Time out taken: No:   Pain Control: Anesthetic  Anesthetic: 4% Lidocaine Liquid Topical       Devitalized Tissue Debrided: slough    Pre Debridement Measurements:  Are located in the Wound/Ulcer Documentation Flow Sheet    Diabetic/Pressure/Non Pressure Ulcers only:  Ulcer: Non-Pressure ulcer, fat layer exposed     Wound/Ulcer #: 1  Post Debridement Measurements:  Wound/Ulcer Descriptions are Pre Debridement except measurements:  Wound 01/29/24 Knee Right #5 (Active)   Wound Image   02/26/24 0951   Wound Etiology Burn 02/05/24 1055   Dressing Status Old drainage noted 02/26/24 0951   Wound Cleansed Cleansed with saline 02/26/24 0951   Dressing/Treatment Petroleum impregnated gauze;Collagen with Ag;Gauze dressing/dressing sponge;Roll gauze;Tape/Soft cloth adhesive tape;Other (comment) 02/26/24 1005   Wound Length (cm) 1.7 cm 02/26/24 0951   Wound Width (cm) 1.8 cm 02/26/24 0951   Wound Depth (cm) 0.1 cm 02/26/24 0951   Wound Surface Area (cm^2) 3.06 cm^2 02/26/24 0951   Change in Wound Size % (l*w) 83.78 02/26/24 0951   Wound Volume (cm^3) 0.306 cm^3 02/26/24 0951   Wound Healing % 92 02/26/24 0951   Post-Procedure Length (cm) 1.7 cm 02/26/24 0959   Post-Procedure Width (cm) 1.8 cm 02/26/24 0959   Post-Procedure Depth (cm) 0.2 cm 02/26/24 0959   Post-Procedure Surface Area (cm^2) 3.06 cm^2 02/26/24 0959   Post-Procedure Volume (cm^3) 0.612 cm^3 02/26/24 0959   Wound Assessment Slough;Bend/red 02/26/24 0951   Drainage Amount Moderate (25-50%) 02/26/24 0951   Drainage Description Serosanguinous 02/26/24 0951   Odor None 02/26/24 0951

## 2024-02-26 NOTE — FLOWSHEET NOTE
02/26/24 0951   Anesthetic   Anesthetic 4% Lidocaine Liquid Topical   Right Leg Edema Point of Measurement   Leg circumference 35 cm   Ankle circumference 21 cm   RLE Neurovascular Assessment   Capillary Refill Less than/Equal to 3 seconds   Color Appropriate for Ethnicity   Temperature Cool   R Pedal Pulse +2   Wound 01/29/24 Knee Right #5   Date First Assessed/Time First Assessed: 01/29/24 0920   Present on Original Admission: Yes  Wound Approximate Age at First Assessment (Weeks): 1 weeks  Primary Wound Type: Burn  Location: Knee  Wound Location Orientation: Right  Wound Description (Co...   Wound Image    Dressing Status Old drainage noted   Wound Cleansed Cleansed with saline   Wound Length (cm) 1.7 cm   Wound Width (cm) 1.8 cm   Wound Depth (cm) 0.1 cm   Wound Surface Area (cm^2) 3.06 cm^2   Change in Wound Size % (l*w) 83.78   Wound Volume (cm^3) 0.306 cm^3   Wound Healing % 92   Wound Assessment Slough;Pink/red   Drainage Amount Moderate (25-50%)   Drainage Description Serosanguinous   Odor None   Piedad-wound Assessment Blanchable erythema   Margins Flat/open edges   Wound Thickness Description not for Pressure Injury Partial thickness     /67   Pulse 69   Temp 97.9 °F (36.6 °C) (Temporal)   Resp 16

## 2024-02-26 NOTE — PATIENT INSTRUCTIONS
foot to knee making sure to cover the heel.  Remove every night before going to bed if desired.  Apply: [] Compression Stocking      []Right Leg           []Left Leg   [] Tubigrip                             [] Right Leg Single Layer  [] Right Leg Double Layer                             [] Left Leg Single Layer [] Left Leg Double Layer      Compression: Do not get leg(s) with wrap wet.  If wraps become too tight call the center or completely remove the wrap.  Apply: [] Three Layer Compression Wrap  []RightLeg []Left Leg  [] Four layer Compression Wrap      []RightLeg []Left Leg   []  Unna's boot                                  [] Right Leg   [] Left Leg       [x] Elevate leg(s) above the level of the heart when sitting.   [x] Avoid prolonged standing in one place.    Dietary:  [] Diet as tolerated [] Diabetic Diet   [x] Increase Protein: examples (Meat, cheese, eggs, greek yogurt, fish, nuts)   [] [place] Therapeutic Nutrition Powder  [] Dial a Dietician : Call [facility] at [phone] enter code (249) when prompted. M-F 9am-5pm EST.     Return Appointment:  [] Nurse Visit  [x] Return Appointment: With Alison Bray in 2 week(s)  [] Ordered tests:     Electronically signed on 2/26/2024 at 8:00 AM     PLEASE NOTE: IF YOU ARE UNABLE TO OBTAIN WOUND SUPPLIES, CONTINUE TO USE THE SUPPLIES YOU HAVE AVAILABLE UNTIL YOU ARE ABLE TO REACH US. IT IS MOST IMPORTANT TO KEEP THE WOUND COVERED AT ALL TIMES.     Physician Signature:_______________________  Alison Bray

## 2024-02-26 NOTE — FLOWSHEET NOTE
02/26/24 1005   Wound 01/29/24 Knee Right #5   Date First Assessed/Time First Assessed: 01/29/24 0920   Present on Original Admission: Yes  Wound Approximate Age at First Assessment (Weeks): 1 weeks  Primary Wound Type: Burn  Location: Knee  Wound Location Orientation: Right  Wound Description (Co...   Dressing/Treatment Petroleum impregnated gauze;Collagen with Ag;Gauze dressing/dressing sponge;Roll gauze;Tape/Soft cloth adhesive tape;Other (comment)  (hydrogel, netting)     Discharge Condition: Stable    Pain: 1    Ambulatory Status: None    Discharge Destination: home    Transportation:car    Accompanied by: SELF    Discharge instructions reviewed with SELF and copy or written instructions have been provided. All questions/concerns have been addressed at this time.

## 2024-03-11 ENCOUNTER — HOSPITAL ENCOUNTER (OUTPATIENT)
Facility: HOSPITAL | Age: 49
Discharge: HOME | End: 2024-03-11
Attending: PODIATRIST
Payer: COMMERCIAL

## 2024-03-11 VITALS
HEART RATE: 69 BPM | DIASTOLIC BLOOD PRESSURE: 59 MMHG | RESPIRATION RATE: 16 BRPM | SYSTOLIC BLOOD PRESSURE: 116 MMHG | TEMPERATURE: 98.2 F

## 2024-03-11 DIAGNOSIS — L97.912 ULCER OF RIGHT LEG, WITH FAT LAYER EXPOSED (HCC): Primary | ICD-10-CM

## 2024-03-11 PROCEDURE — 99211 OFF/OP EST MAY X REQ PHY/QHP: CPT

## 2024-03-11 RX ORDER — BETAMETHASONE DIPROPIONATE 0.5 MG/G
CREAM TOPICAL ONCE
Status: CANCELLED | OUTPATIENT
Start: 2024-03-11 | End: 2024-03-11

## 2024-03-11 RX ORDER — LIDOCAINE HYDROCHLORIDE 40 MG/ML
SOLUTION TOPICAL ONCE
Status: CANCELLED | OUTPATIENT
Start: 2024-03-11 | End: 2024-03-11

## 2024-03-11 RX ORDER — BACITRACIN ZINC AND POLYMYXIN B SULFATE 500; 1000 [USP'U]/G; [USP'U]/G
OINTMENT TOPICAL ONCE
Status: CANCELLED | OUTPATIENT
Start: 2024-03-11 | End: 2024-03-11

## 2024-03-11 RX ORDER — SODIUM CHLOR/HYPOCHLOROUS ACID 0.033 %
SOLUTION, IRRIGATION IRRIGATION ONCE
Status: CANCELLED | OUTPATIENT
Start: 2024-03-11 | End: 2024-03-11

## 2024-03-11 RX ORDER — GENTAMICIN SULFATE 1 MG/G
OINTMENT TOPICAL ONCE
Status: CANCELLED | OUTPATIENT
Start: 2024-03-11 | End: 2024-03-11

## 2024-03-11 RX ORDER — IBUPROFEN 200 MG
TABLET ORAL ONCE
Status: CANCELLED | OUTPATIENT
Start: 2024-03-11 | End: 2024-03-11

## 2024-03-11 RX ORDER — GINSENG 100 MG
CAPSULE ORAL ONCE
Status: CANCELLED | OUTPATIENT
Start: 2024-03-11 | End: 2024-03-11

## 2024-03-11 RX ORDER — LIDOCAINE HYDROCHLORIDE 20 MG/ML
JELLY TOPICAL ONCE
Status: CANCELLED | OUTPATIENT
Start: 2024-03-11 | End: 2024-03-11

## 2024-03-11 RX ORDER — TRIAMCINOLONE ACETONIDE 1 MG/G
OINTMENT TOPICAL ONCE
Status: CANCELLED | OUTPATIENT
Start: 2024-03-11 | End: 2024-03-11

## 2024-03-11 RX ORDER — LIDOCAINE 40 MG/G
CREAM TOPICAL ONCE
Status: CANCELLED | OUTPATIENT
Start: 2024-03-11 | End: 2024-03-11

## 2024-03-11 RX ORDER — CLOBETASOL PROPIONATE 0.5 MG/G
OINTMENT TOPICAL ONCE
Status: CANCELLED | OUTPATIENT
Start: 2024-03-11 | End: 2024-03-11

## 2024-03-11 RX ORDER — LIDOCAINE 50 MG/G
OINTMENT TOPICAL ONCE
Status: CANCELLED | OUTPATIENT
Start: 2024-03-11 | End: 2024-03-11

## 2024-03-11 NOTE — FLOWSHEET NOTE
03/11/24 0950   Right Leg Edema Point of Measurement   Leg circumference 36 cm   Ankle circumference 21 cm   RLE Neurovascular Assessment   Capillary Refill Less than/Equal to 3 seconds   Color Appropriate for Ethnicity   Temperature Warm   R Pedal Pulse +2   Wound 01/29/24 Knee Right #5   Date First Assessed/Time First Assessed: 01/29/24 0920   Present on Original Admission: Yes  Wound Approximate Age at First Assessment (Weeks): 1 weeks  Primary Wound Type: Burn  Location: Knee  Wound Location Orientation: Right  Wound Description (Co...   Wound Image    Wound Cleansed Cleansed with saline   Wound Length (cm) 0.1 cm   Wound Width (cm) 0.1 cm   Wound Depth (cm) 0.1 cm   Wound Surface Area (cm^2) 0.01 cm^2   Change in Wound Size % (l*w) 99.95   Wound Volume (cm^3) 0.001 cm^3   Wound Healing % 100   Wound Assessment Pink/red   Drainage Amount None (dry)   Odor None   Piedad-wound Assessment Intact   Margins Attached edges   Pain Assessment   Pain Assessment None - Denies Pain     BP (!) 116/59   Pulse 69   Temp 98.2 °F (36.8 °C) (Temporal)   Resp 16

## 2024-03-11 NOTE — WOUND CARE
[facility]   [facility]   Medical Staff Progress Note     Griselda Smith  MEDICAL RECORD NUMBER:  [id number]  AGE: 48 y.o.   GENDER: female  : 1975  EPISODE DATE:  3/11/2024    Chief complaint and reason for visit:     Chief Complaint   Patient presents with    Wound Check     Right knee      Patient presents back today for right leg wound. She has been caring for it as directed. Patient relates improvement in pain.    Medical Decision Making:     Problem List Items Addressed This Visit          Other    Ulcer of right leg, with fat layer exposed (HCC) - Primary    Relevant Medications    lidocaine 4 % jelly    Other Relevant Orders    Initiate Outpatient Wound Care Protocol       Wounds and Treatment Plan:      Wound healed today. Advised patient to apply moisturizing cream and mederma gel for scarring    Patient will follow back as needed.      TIME: E/M Time spent with patient and/or patient care issues: [x] 15-20 min  [] 21-30 min  [] 31-44 min  [] 45 min or more.   This is above the usual time needed to address patient's chief complaint today: [] Yes  [] No  This time includes physician non-face-to-face service time visit on the date of service such as  Preparing to see the patient (eg, review of tests)  Obtaining and/or reviewing separately obtained history  Performing a medically necessary appropriate examination and/or evaluation  Counseling and educating the patient/family/caregiver  Ordering medications, tests, or procedures  Referring and communicating with other health care professionals as needed  Documenting clinical information in the electronic or other health record  Independently interpreting results (not reported separately) and communicating results to the patient/family/caregiver  Care coordination (not reported separately)    Objective:    BP (!) 116/59   Pulse 69   Temp 98.2 °F (36.8 °C) (Temporal)   Resp 16   Wt Readings

## 2024-03-14 ENCOUNTER — PROCEDURE VISIT (OUTPATIENT)
Age: 49
End: 2024-03-14
Payer: COMMERCIAL

## 2024-03-14 DIAGNOSIS — G43.711 CHRONIC MIGRAINE WITHOUT AURA, INTRACTABLE, WITH STATUS MIGRAINOSUS: Primary | ICD-10-CM

## 2024-03-14 PROCEDURE — 64615 CHEMODENERV MUSC MIGRAINE: CPT | Performed by: NURSE PRACTITIONER

## 2024-03-14 RX ORDER — ERENUMAB-AOOE 70 MG/ML
INJECTION SUBCUTANEOUS
Qty: 3 ML | Refills: 2 | Status: SHIPPED | OUTPATIENT
Start: 2024-03-14

## 2024-03-14 NOTE — PROGRESS NOTES
OFFICE PROCEDURE PROGRESS NOTE        Chart reviewed for the following:   Jackelin RUST APRN - NP, have reviewed the History, Physical and updated the Allergic reactions for Griselda Smith     TIME OUT performed immediately prior to start of procedure:   Jackelin RUST APRN - NP, have performed the following reviews on Griselda Smith prior to the start of the procedure:            * Patient was identified by name and date of birth   * Agreement on procedure being performed was verified  * Risks and Benefits explained to the patient  * Procedure site verified and marked as necessary  * Patient was positioned for comfort  * Consent was signed and verified     Time: 1100      Date of procedure: 3/14/2024    Procedure performed by:  JOSHUA Tena NP    Provider assisted by: None    Patient assisted by: None    How tolerated by patient: tolerated the procedure well with no complications    Post Procedural Pain Scale: 2 - Hurts Little Bit    Comments: None          Botox Injection Note       Indication: patient has chronic recurrent migraine, has 7-10 less migraine days per month with botox injections    Procedure:   Botox concentration: 200 units in 4 ml of preservative-free normal saline.   31 sites injections, distribution as follow      Units/site  Sites Sides Subtotal    Procerus 5 1 1 5    5 1 2 10   Frontalis 5 2 2 20   Temporalis 5 4 2 40   Occipitalis 5 3 2 30   Upper cervical paraspinalis 5 2 2 20   Trapezius 5 3 2 30         200 units Botox were reconstituted, 155 units injected as above and the remainder was unavoidably wasted.     Patient tolerated procedure well.     _____________________________   JACKELIN BLACKMAN

## 2024-04-03 ENCOUNTER — TELEPHONE (OUTPATIENT)
Age: 49
End: 2024-04-03

## 2024-04-03 DIAGNOSIS — G43.711 CHRONIC MIGRAINE WITHOUT AURA, INTRACTABLE, WITH STATUS MIGRAINOSUS: Primary | ICD-10-CM

## 2024-04-03 NOTE — TELEPHONE ENCOUNTER
EMGALITY PA initiated through Cover my meds key # -  [id number]    PA Case: [id number], Status: Approved, Coverage Starts on: 4/3/2024 12:00:00 AM, Coverage Ends on: 5/1/2024 12:00:00 AM.. Authorization Expiration Date: May 1, 2024.

## 2024-04-03 NOTE — TELEPHONE ENCOUNTER
----- Message from Griselda Smith sent at 4/3/2024  5:03 PM EDT -----  Regarding: Emgality  Contact: [phone]  Sure - worth a try thanks!!

## 2024-04-30 DIAGNOSIS — G43.711 CHRONIC MIGRAINE WITHOUT AURA, INTRACTABLE, WITH STATUS MIGRAINOSUS: ICD-10-CM

## 2024-05-02 DIAGNOSIS — G43.711 CHRONIC MIGRAINE WITHOUT AURA, INTRACTABLE, WITH STATUS MIGRAINOSUS: ICD-10-CM

## 2024-05-05 DIAGNOSIS — G43.711 CHRONIC MIGRAINE WITHOUT AURA, INTRACTABLE, WITH STATUS MIGRAINOSUS: ICD-10-CM

## 2024-05-05 RX ORDER — GALCANEZUMAB 120 MG/ML
INJECTION, SOLUTION SUBCUTANEOUS
OUTPATIENT
Start: 2024-05-05

## 2024-05-08 RX ORDER — ERENUMAB-AOOE 70 MG/ML
INJECTION SUBCUTANEOUS
Qty: 3 ML | Refills: 2 | Status: SHIPPED | OUTPATIENT
Start: 2024-05-08

## 2024-06-13 ENCOUNTER — PROCEDURE VISIT (OUTPATIENT)
Age: 49
End: 2024-06-13
Payer: COMMERCIAL

## 2024-06-13 DIAGNOSIS — G43.711 CHRONIC MIGRAINE WITHOUT AURA, INTRACTABLE, WITH STATUS MIGRAINOSUS: Primary | ICD-10-CM

## 2024-06-13 PROCEDURE — 64615 CHEMODENERV MUSC MIGRAINE: CPT | Performed by: NURSE PRACTITIONER

## 2024-06-13 RX ORDER — ERENUMAB-AOOE 70 MG/ML
INJECTION SUBCUTANEOUS
Qty: 3 ML | Refills: 2 | Status: SHIPPED | OUTPATIENT
Start: 2024-06-13

## 2024-06-13 NOTE — PROGRESS NOTES
OFFICE PROCEDURE PROGRESS NOTE        Chart reviewed for the following:   Jackelin RUST APRN - NP, have reviewed the History, Physical and updated the Allergic reactions for Griselda Smith     TIME OUT performed immediately prior to start of procedure:   Jackelin RUST APRN - NP, have performed the following reviews on Griselda Smith prior to the start of the procedure:            * Patient was identified by name and date of birth   * Agreement on procedure being performed was verified  * Risks and Benefits explained to the patient  * Procedure site verified and marked as necessary  * Patient was positioned for comfort  * Consent was signed and verified     Time: 1100      Date of procedure: 6/13/2024    Procedure performed by:  JOSHUA Tena NP    Provider assisted by: None    Patient assisted by: None    How tolerated by patient: tolerated the procedure well with no complications    Post Procedural Pain Scale: 2 - Hurts Little Bit    Comments: None            Botox Injection Note       Indication: patient has chronic recurrent migraine, has 7-10 less migraine days per month with botox injections    Procedure:   Botox concentration: 200 units in 4 ml of preservative-free normal saline.   31 sites injections, distribution as follow      Units/site  Sites Sides Subtotal    Procerus 5 1 1 5    5 1 2 10   Frontalis 5 2 2 20   Temporalis 5 4 2 40   Occipitalis 5 3 2 30   Upper cervical paraspinalis 5 2 2 20   Trapezius 5 3 2 30         200 units Botox were reconstituted, 155 units injected as above and the remainder was unavoidably wasted.     Patient tolerated procedure well.     _____________________________   JACKELIN BLACKMAN         Emgality is not helping.     Switch back to Aimovig 70 mg every 30 days and STOP emgality.   Aimovig was working great before we were forced to switch.

## 2024-07-12 DIAGNOSIS — G43.711 CHRONIC MIGRAINE WITHOUT AURA, INTRACTABLE, WITH STATUS MIGRAINOSUS: Primary | ICD-10-CM

## 2024-07-17 ENCOUNTER — TELEPHONE (OUTPATIENT)
Age: 49
End: 2024-07-17

## 2024-07-17 RX ORDER — GALCANEZUMAB 120 MG/ML
INJECTION, SOLUTION SUBCUTANEOUS
Refills: 0 | OUTPATIENT
Start: 2024-07-17

## 2024-07-17 NOTE — TELEPHONE ENCOUNTER
----- Message from JOSHUA Rahman NP sent at 7/17/2024 12:49 PM EDT -----  Regarding: PAULINA: Aimovig denied  Contact: [phone]        ----- Message -----  From: Carlie Rubio MA  Sent: 7/17/2024   7:37 AM EDT  To: JOSHUA Rahman NP  Subject: PAULINA: Aimovig denied                                 ----- Message -----  From: Griselda Smith  Sent: 7/16/2024   7:15 PM EDT  To: #  Subject: Aimovig denied                                   I see it was denied - should I refill Emgality instead?

## 2024-07-17 NOTE — TELEPHONE ENCOUNTER
She has already FAILED EMGALITY      Have a new PA Done for Aimovig please   And give her samples

## 2024-07-17 NOTE — TELEPHONE ENCOUNTER
----- Message from JOSHUA Rahman NP sent at 7/17/2024 12:49 PM EDT -----  Regarding: PAULINA: Aimovig denied  Contact: [phone]        ----- Message -----  From: Carlie Rubio MA  Sent: 7/17/2024   7:37 AM EDT  To: JOSHUA Rahman NP  Subject: PAULINA: Aimovig denied                                 ----- Message -----  From: Griselda Smith  Sent: 7/16/2024   7:15 PM EDT  To: #  Subject: Aimovig denied                                   I see it was denied - should I refill Emgality instead?

## 2024-07-19 NOTE — TELEPHONE ENCOUNTER
RE:Emgality  Key: [id number]         Request was added and submitted via CMM has been approved      Approvedtoday  PA Case: [id number], Status: Approved, Coverage Starts on: 7/19/2024 12:00:00 AM, Coverage Ends on: 10/17/2024 12:00:00 AM.        Nurse notified

## 2024-07-30 ENCOUNTER — TELEPHONE (OUTPATIENT)
Age: 49
End: 2024-07-30

## 2024-07-30 NOTE — TELEPHONE ENCOUNTER
RE:Aimovig      Rcvd letter that medication has been approved.    Effect: 07/19/204-10/17/2024        Letter in media  Nurse notified

## 2024-08-27 ENCOUNTER — TELEPHONE (OUTPATIENT)
Age: 49
End: 2024-08-27

## 2024-08-27 NOTE — TELEPHONE ENCOUNTER
Patient is looking to see if she can have her Botox appt r/s from 09/06 to 09/05.    Please advise.

## 2024-09-04 ENCOUNTER — TELEPHONE (OUTPATIENT)
Age: 49
End: 2024-09-04

## 2024-09-04 NOTE — TELEPHONE ENCOUNTER
Botox Drug Acquisition: BUY AND BILL  Drug: BOTOX 200 units Dx: G43.711  Insurance: [employer]  Submission Type: Availity  [facility]:   CPT: 61216  Availity Request Tracking ID: [id number]   Reference #: [id number]   Approval Range: *DENIED*    Scanned denial letter to media for providers review

## 2024-09-09 ENCOUNTER — TELEMEDICINE (OUTPATIENT)
Age: 49
End: 2024-09-09
Payer: COMMERCIAL

## 2024-09-09 DIAGNOSIS — G43.711 CHRONIC MIGRAINE WITHOUT AURA, INTRACTABLE, WITH STATUS MIGRAINOSUS: Primary | ICD-10-CM

## 2024-09-09 PROCEDURE — 99214 OFFICE O/P EST MOD 30 MIN: CPT | Performed by: NURSE PRACTITIONER

## 2024-09-10 NOTE — TELEPHONE ENCOUNTER
I have contacted Pastor at [phone] per rep May, reconsideration is still availible (within 10 days of the denial date), submitted via fax follow up office note from 09/09/24 with copy of denial letter, labeled as urgent due to -patient's appointment being 09/12/24. Rep stated if reconsideration is denied, can contact- [phone] to set up Peer to Peer; or can call back to customer service to start the office appeal process, which can take an upwards of 60 days to respond.    Reference: [id number]

## 2024-09-10 NOTE — TELEPHONE ENCOUNTER
Reconsideration has been approved as of 09/10/24 2:38 pm    Botox Drug Acquisition: BUY AND BILL  Drug: BOTOX 200 units Dx: G43.711  Insurance: [place]  Submission Type: Availity  \[facility]\"":   CPT: 03187  Reference #: [id number]   Approval Range: 09/04/24 to 09/03/25    Scanned approved reconsideration letter to media

## 2024-09-12 ENCOUNTER — OFFICE VISIT (OUTPATIENT)
Age: 49
End: 2024-09-12

## 2024-09-12 DIAGNOSIS — G43.711 CHRONIC MIGRAINE WITHOUT AURA, INTRACTABLE, WITH STATUS MIGRAINOSUS: Primary | ICD-10-CM

## 2024-12-05 ENCOUNTER — OFFICE VISIT (OUTPATIENT)
Age: 49
End: 2024-12-05
Payer: COMMERCIAL

## 2024-12-05 DIAGNOSIS — G43.711 CHRONIC MIGRAINE WITHOUT AURA, INTRACTABLE, WITH STATUS MIGRAINOSUS: Primary | ICD-10-CM

## 2024-12-05 PROCEDURE — 64615 CHEMODENERV MUSC MIGRAINE: CPT | Performed by: NURSE PRACTITIONER

## 2024-12-09 NOTE — PROGRESS NOTES
OFFICE PROCEDURE PROGRESS NOTE        Chart reviewed for the following:   Jackelin RUST APRN - NP, have reviewed the History, Physical and updated the Allergic reactions for Griselda Smith     TIME OUT performed immediately prior to start of procedure:   Jackelin RUST APRN - NP, have performed the following reviews on Griselda Smith prior to the start of the procedure:            * Patient was identified by name and date of birth   * Agreement on procedure being performed was verified  * Risks and Benefits explained to the patient  * Procedure site verified and marked as necessary  * Patient was positioned for comfort  * Consent was signed and verified     Time: 1100      Date of procedure:12/5/2024    Procedure performed by:  JOSHUA Tena NP    Provider assisted by: None    Patient assisted by: None    How tolerated by patient: tolerated the procedure well with no complications    Post Procedural Pain Scale: 2 - Hurts Little Bit    Comments: None            Botox Injection Note       Indication: patient has chronic recurrent migraine, has 7-10 less migraine days per month with botox injections    Procedure:   Botox concentration: 200 units in 4 ml of preservative-free normal saline.   31 sites injections, distribution as follow      Units/site  Sites Sides Subtotal    Procerus 5 1 1 5    5 1 2 10   Frontalis 5 2 2 20   Temporalis 5 4 2 40   Occipitalis 5 3 2 30   Upper cervical paraspinalis 5 2 2 20   Trapezius 5 3 2 30         200 units Botox were reconstituted, 155 units injected as above and the remainder was unavoidably wasted.     Patient tolerated procedure well.     _____________________________   JACKELIN BLACKMAN

## 2024-12-16 ENCOUNTER — TELEPHONE (OUTPATIENT)
Age: 49
End: 2024-12-16

## 2024-12-16 NOTE — TELEPHONE ENCOUNTER
RE:Aimovig  Key: [id number]     Requested medication has been approved:    Approved today by [employer] [facility] Case: [id number], Status: Approved, Coverage Starts on: 12/16/2024 12:00:00 AM, Coverage Ends on: 12/16/2025 12:00:00 AM.  Authorization Expiration Date: 12/15/2025      Nurse notified

## 2025-04-16 ENCOUNTER — TELEPHONE (OUTPATIENT)
Age: 50
End: 2025-04-16

## 2025-04-17 ENCOUNTER — OFFICE VISIT (OUTPATIENT)
Age: 50
End: 2025-04-17

## 2025-04-17 DIAGNOSIS — G43.711 CHRONIC MIGRAINE WITHOUT AURA, INTRACTABLE, WITH STATUS MIGRAINOSUS: Primary | ICD-10-CM

## 2025-04-17 NOTE — PROGRESS NOTES
OFFICE PROCEDURE PROGRESS NOTE      Chief Complaint   Patient presents with    Botox Injection     Patient states they are having  15-20   migraines per month, and patient has  80  % in reduction of migraines since the start of botox.                Chart reviewed for the following:   Jackelin RUST APRN - NP, have reviewed the History, Physical and updated the Allergic reactions for Griselda Smith     TIME OUT performed immediately prior to start of procedure:   Jackelin RUST APRN - NP, have performed the following reviews on Griselda Smith prior to the start of the procedure:            * Patient was identified by name and date of birth   * Agreement on procedure being performed was verified  * Risks and Benefits explained to the patient  * Procedure site verified and marked as necessary  * Patient was positioned for comfort  * Consent was signed and verified     Time: 1400      Date of procedure: 4/17/2025    Procedure performed by:  JOSHUA Tena NP    Provider assisted by: None    Patient assisted by: None    How tolerated by patient: tolerated the procedure well with no complications    Post Procedural Pain Scale: 2 - Hurts Little Bit    Comments: None      Botox Injection Note       Indication: patient has chronic recurrent migraine, has 7-10 less migraine days per month with botox injections    Procedure:   Botox concentration: 200 units in 4 ml of preservative-free normal saline.   31 sites injections, distribution as follow      Units/site  Sites Sides Subtotal    Procerus 5 1 1 5    5 1 2 10   Frontalis 5 2 2 20   Temporalis 5 4 2 40   Occipitalis 5 3 2 30   Upper cervical paraspinalis 5 2 2 20   Trapezius 5 3 2 30         200 units Botox were reconstituted, 155 units injected as above and the remainder was unavoidably wasted.     Patient tolerated procedure well.     _____________________________   JACKELIN BLACKMAN

## 2025-04-21 NOTE — TELEPHONE ENCOUNTER
Botox Drug Acquisition: BUY AND BILL  POS 11 Druu Btx  Dx: G43.711  Insurance: BC[employer]  Submission Type: Availity  [facility] [place]:   CPT: 15072  Reference #: [id number]  Approval Range: PA APPROVED 25-26 (5 visits)    25: rcvd  benefit back and indicated no auth on file, see grp # changed on insurnace card so submitted new PA.

## 2025-05-13 ENCOUNTER — OFFICE VISIT (OUTPATIENT)
Age: 50
End: 2025-05-13
Payer: COMMERCIAL

## 2025-05-13 VITALS
HEART RATE: 87 BPM | RESPIRATION RATE: 18 BRPM | SYSTOLIC BLOOD PRESSURE: 110 MMHG | OXYGEN SATURATION: 97 % | DIASTOLIC BLOOD PRESSURE: 72 MMHG

## 2025-05-13 DIAGNOSIS — G43.711 CHRONIC MIGRAINE WITHOUT AURA, WITH INTRACTABLE MIGRAINE, SO STATED, WITH STATUS MIGRAINOSUS: Primary | ICD-10-CM

## 2025-05-13 PROCEDURE — 99214 OFFICE O/P EST MOD 30 MIN: CPT | Performed by: NURSE PRACTITIONER

## 2025-05-13 RX ORDER — ERENUMAB-AOOE 70 MG/ML
INJECTION SUBCUTANEOUS
Qty: 3 ML | Refills: 2 | Status: ACTIVE | OUTPATIENT
Start: 2025-05-13

## 2025-05-13 NOTE — PROGRESS NOTES
Griselda Smith is a 49 y.o. female who presents with the following  Chief Complaint   Patient presents with    Follow-up     Patient is here following up for migraines. Patient reports that her before botox she was having 15-20 migraines a month now she doesn't have any migraines.        HPI    Following back up for Botox    Just for reauthorization    She does continue to see significant reduction in migraines  She was having about 15-20 a month before Botox  Right now she gets about 1-2 a month after Botox  Sometimes she will have none at all    She does state that she has seen robust improvement  She is not as sensitive to triggers either    She does have the Nurtec as needed but has not needed this for rescue much       No Known Allergies    Current Outpatient Medications   Medication Sig Dispense Refill    Erenumab-aooe (AIMOVIG) 70 MG/ML SOAJ INJECT 1 ML BY SUBCUTANEOUS ROUTE EVERY THIRTY (30) DAYS. 3 mL 2    Rimegepant Sulfate (NURTEC) 75 MG TBDP Take 75 mg by mouth as needed (Max daily dose 1 tablet) 8 tablet 3    Onabotulinumtoxin A (BOTOX) 200 units injection Inject 155 units into 31 FDA approved sites in head, face, neck, every 3 months for chronic migraine. 1 each 3    FLUoxetine (PROZAC) 20 MG capsule Take 1 capsule by mouth      spironolactone (ALDACTONE) 50 MG tablet Take by mouth daily       No current facility-administered medications for this visit.        Social History     Tobacco Use   Smoking Status Never   Smokeless Tobacco Never       Past Medical History:   Diagnosis Date    Acne     Spirolactone for this    Anxiety     Migraine     Psychiatric disorder     OCD       Past Surgical History:   Procedure Laterality Date    BUNIONECTOMY Right 2018    PELVIC LAPAROSCOPY  1995    ovarian cyst removed       Family History   Problem Relation Age of Onset    Atrial Fibrillation Mother     Prostate Cancer Father        Social History     Socioeconomic History    Marital status:

## 2025-07-10 ENCOUNTER — OFFICE VISIT (OUTPATIENT)
Age: 50
End: 2025-07-10
Payer: COMMERCIAL

## 2025-07-10 DIAGNOSIS — G43.711 CHRONIC MIGRAINE WITHOUT AURA, WITH INTRACTABLE MIGRAINE, SO STATED, WITH STATUS MIGRAINOSUS: Primary | ICD-10-CM

## 2025-07-10 PROCEDURE — 64615 CHEMODENERV MUSC MIGRAINE: CPT | Performed by: NURSE PRACTITIONER

## 2025-07-10 NOTE — PROGRESS NOTES
OFFICE PROCEDURE PROGRESS NOTE      Chief Complaint   Patient presents with    Botox Injection             Chart reviewed for the following:   Jackelin RUST DNP, have reviewed the History, Physical and updated the Allergic reactions for Griselda Smith     TIME OUT performed immediately prior to start of procedure:   Jackelin RUST DNP, have performed the following reviews on Griselda Smith prior to the start of the procedure:            * Patient was identified by name and date of birth   * Agreement on procedure being performed was verified  * Risks and Benefits explained to the patient  * Procedure site verified and marked as necessary  * Patient was positioned for comfort  * Consent was signed and verified     Time: 1400      Date of procedure: 7/10/2025    Procedure performed by:  Jackelin Banegas DNP    Provider assisted by: None    Patient assisted by: None    How tolerated by patient: tolerated the procedure well with no complications    Post Procedural Pain Scale: 2 - Hurts Little Bit    Comments: None    Botox Injection Note       Indication: patient has chronic recurrent migraine, has 7-10 less migraine days per month with botox injections    Procedure:   Botox concentration: 200 units in 4 ml of preservative-free normal saline.   31 sites injections, distribution as follow      Units/site  Sites Sides Subtotal    Procerus 5 1 1 5    5 1 2 10   Frontalis 5 2 2 20   Temporalis 5 4 2 40   Occipitalis 5 3 2 30   Upper cervical paraspinalis 5 2 2 20   Trapezius 5 3 2 30         200 units Botox were reconstituted, 155 units injected as above and the remainder was unavoidably wasted.     Patient tolerated procedure well.     _____________________________   JACKELIN BLACKMAN

## (undated) DEVICE — SURGICAL PROCEDURE PACK BASIN MAJ SET CUST NO CAUT

## (undated) DEVICE — ROCKER SWITCH PENCIL BLADE ELECTRODE, HOLSTER: Brand: EDGE

## (undated) DEVICE — 3M™ STERI-STRIP™ REINFORCED ADHESIVE SKIN CLOSURES, R1546, 1/4 IN X 4 IN (6 MM X 100 MM), 10 STRIPS/ENVELOPE: Brand: 3M™ STERI-STRIP™

## (undated) DEVICE — MASTISOL ADHESIVE LIQ 2/3ML

## (undated) DEVICE — (D)PREP SKN CHLRAPRP APPL 26ML -- CONVERT TO ITEM 371833

## (undated) DEVICE — REM POLYHESIVE ADULT PATIENT RETURN ELECTRODE: Brand: VALLEYLAB

## (undated) DEVICE — COVER LT HNDL BLU PLAS

## (undated) DEVICE — DRAPE,U/ SHT,SPLIT,PLAS,STERIL: Brand: MEDLINE

## (undated) DEVICE — INTENDED FOR TISSUE SEPARATION, AND OTHER PROCEDURES THAT REQUIRE A SHARP SURGICAL BLADE TO PUNCTURE OR CUT.: Brand: BARD-PARKER ® CARBON RIB-BACK BLADES

## (undated) DEVICE — SUTURE VCRL SZ 3-0 L27IN ABSRB UD L19MM PS-2 3/8 CIR PRIM J427H

## (undated) DEVICE — DRAPE,EXTREMITY,89X128,STERILE: Brand: MEDLINE

## (undated) DEVICE — COUNTER SINK

## (undated) DEVICE — GAUZE SPONGES,12 PLY: Brand: CURITY

## (undated) DEVICE — STANDARD SURGICAL GOWN, L: Brand: CONVERTORS

## (undated) DEVICE — BLADE SAW OSC COARSE 25X9MM --

## (undated) DEVICE — STRETCH BANDAGE ROLL: Brand: DERMACEA

## (undated) DEVICE — STERILE POLYISOPRENE POWDER-FREE SURGICAL GLOVES: Brand: PROTEXIS

## (undated) DEVICE — ARGYLE FRAZIER SURGICAL SUCTION INSTRUMENT 10 FR/CH (3.3 MM): Brand: ARGYLE

## (undated) DEVICE — BANDAGE COMPR 9 FTX4 IN SMOOTH COMFORTABLE SYNTH ESMRK LF

## (undated) DEVICE — SUTURE NONABSORBABLE MONOFILAMENT 4-0 PS-2 18 IN BLU PROLENE 8682H

## (undated) DEVICE — PADDING CST 4INX4YD --

## (undated) DEVICE — LIGHT HANDLE: Brand: DEVON

## (undated) DEVICE — SUTURE VCRL SZ 2-0 L27IN ABSRB UD L26MM SH 1/2 CIR J417H

## (undated) DEVICE — BASIC PACK: Brand: CONVERTORS

## (undated) DEVICE — SYR 10ML LUER LOK 1/5ML GRAD --

## (undated) DEVICE — SOLUTION IV 1000ML 0.9% SOD CHL

## (undated) DEVICE — SUTURE MCRYL SZ 4-0 L27IN ABSRB UD L19MM PS-2 1/2 CIR PRIM Y426H

## (undated) DEVICE — DEVON™ KNEE AND BODY STRAP 60" X 3" (1.5 M X 7.6 CM): Brand: DEVON

## (undated) DEVICE — NEEDLE HYPO 25GA L1.5IN BVL ORIENTED ECLIPSE

## (undated) DEVICE — ZIMMER® STERILE DISPOSABLE TOURNIQUET CUFF WITH PROTECTIVE SLEEVE AND PLC, DUAL PORT, SINGLE BLADDER, 18 IN. (46 CM)

## (undated) DEVICE — DRAPE SHT 3 QTR PROXIMA 53X77 --

## (undated) DEVICE — 1010 S-DRAPE TOWEL DRAPE 10/BX: Brand: STERI-DRAPE™

## (undated) DEVICE — TAPE CST LT 4INX4YD FBRGLS WHT --